# Patient Record
Sex: MALE | Race: WHITE | NOT HISPANIC OR LATINO | Employment: FULL TIME | URBAN - METROPOLITAN AREA
[De-identification: names, ages, dates, MRNs, and addresses within clinical notes are randomized per-mention and may not be internally consistent; named-entity substitution may affect disease eponyms.]

---

## 2017-01-30 ENCOUNTER — TELEPHONE (OUTPATIENT)
Dept: FAMILY MEDICINE | Facility: CLINIC | Age: 42
End: 2017-01-30

## 2017-01-30 DIAGNOSIS — F34.1 DYSTHYMIC DISORDER: Primary | ICD-10-CM

## 2017-01-30 NOTE — TELEPHONE ENCOUNTER
Received a PA Request from [facility]    Re: citalopram (CELEXA) 20 MG tablet, Sig: Take 2 tablets (40 mg) by mouth daily, Disp 180 x 3, Approved 10/31/2016    Request note: Plan does not cover 2 per day.    Can he take one 40mg?  Or do I need to start a PA, they are going to ask the same question for the PA.    Let me know.    Isaias Starr RT (r)  [facility]

## 2017-02-07 RX ORDER — CITALOPRAM HYDROBROMIDE 40 MG/1
40 TABLET ORAL DAILY
Qty: 90 TABLET | Refills: 2 | Status: SHIPPED | OUTPATIENT
Start: 2017-02-07 | End: 2017-11-01

## 2017-10-23 DIAGNOSIS — F34.1 DYSTHYMIC DISORDER: ICD-10-CM

## 2017-10-27 RX ORDER — CITALOPRAM HYDROBROMIDE 40 MG/1
TABLET ORAL
Qty: 90 TABLET | Refills: 0 | OUTPATIENT
Start: 2017-10-27

## 2017-10-27 NOTE — TELEPHONE ENCOUNTER
Pt due for an appointment to see Dr. Mccollum.  Scheduled in clinic on 10/30/17.  Has medication to get to this appointment.  Will ask for refills at that time.    Ave REYNOLDS RN

## 2017-11-01 ENCOUNTER — OFFICE VISIT (OUTPATIENT)
Dept: FAMILY MEDICINE | Facility: CLINIC | Age: 42
End: 2017-11-01
Payer: COMMERCIAL

## 2017-11-01 VITALS
SYSTOLIC BLOOD PRESSURE: 102 MMHG | DIASTOLIC BLOOD PRESSURE: 68 MMHG | BODY MASS INDEX: 29.51 KG/M2 | HEART RATE: 74 BPM | WEIGHT: 206.13 LBS | HEIGHT: 70 IN

## 2017-11-01 DIAGNOSIS — F34.1 DYSTHYMIC DISORDER: Primary | ICD-10-CM

## 2017-11-01 PROCEDURE — 99214 OFFICE O/P EST MOD 30 MIN: CPT | Performed by: FAMILY MEDICINE

## 2017-11-01 RX ORDER — CITALOPRAM HYDROBROMIDE 20 MG/1
TABLET ORAL
Qty: 180 TABLET | Refills: 3 | Status: SHIPPED | OUTPATIENT
Start: 2017-11-01 | End: 2018-10-23

## 2017-11-01 ASSESSMENT — ANXIETY QUESTIONNAIRES
1. FEELING NERVOUS, ANXIOUS, OR ON EDGE: NOT AT ALL
3. WORRYING TOO MUCH ABOUT DIFFERENT THINGS: NOT AT ALL
2. NOT BEING ABLE TO STOP OR CONTROL WORRYING: NOT AT ALL
GAD7 TOTAL SCORE: 1
6. BECOMING EASILY ANNOYED OR IRRITABLE: SEVERAL DAYS
5. BEING SO RESTLESS THAT IT IS HARD TO SIT STILL: NOT AT ALL
7. FEELING AFRAID AS IF SOMETHING AWFUL MIGHT HAPPEN: NOT AT ALL

## 2017-11-01 ASSESSMENT — PATIENT HEALTH QUESTIONNAIRE - PHQ9
SUM OF ALL RESPONSES TO PHQ QUESTIONS 1-9: 4
5. POOR APPETITE OR OVEREATING: NOT AT ALL

## 2017-11-01 NOTE — LETTER
My Depression Action Plan  Name: Bernabe Garcia   Date of Birth 1975  Date: 11/1/2017    My doctor: Aislinn Mccollum   My clinic: [address] [id number] [phone]          GREEN    ZONE   Good Control    What it looks like:     Things are going generally well. You have normal up s and down s. You may even feel depressed from time to time, but bad moods usually last less than a day.   What you need to do:  1. Continue to care for yourself (see self care plan)  2. Check your depression survival kit and update it as needed  3. Follow your physician s recommendations including any medication.  4. Do not stop taking medication unless you consult with your physician first.           YELLOW         ZONE Getting Worse    What it looks like:     Depression is starting to interfere with your life.     It may be hard to get out of bed; you may be starting to isolate yourself from others.    Symptoms of depression are starting to last most all day and this has happened for several days.     You may have suicidal thoughts but they are not constant.   What you need to do:     1. Call your care team, your response to treatment will improve if you keep your care team informed of your progress. Yellow periods are signs an adjustment may need to be made.     2. Continue your self-care, even if you have to fake it!    3. Talk to someone in your support network    4. Open up your depression survival kit           RED    ZONE Medical Alert - Get Help    What it looks like:     Depression is seriously interfering with your life.     You may experience these or other symptoms: You can t get out of bed most days, can t work or engage in other necessary activities, you have trouble taking care of basic hygiene, or basic responsibilities, thoughts of suicide or death that will not go away, self-injurious behavior.     What you need to do:  1. Call your care team and  request a same-day appointment. If they are not available (weekends or after hours) call your local crisis line, emergency room or 911.      Electronically signed by: Ly Wilkes, November 1, 2017    Depression Self Care Plan / Survival Kit    Self-Care for Depression  Here s the deal. Your body and mind are really not as separate as most people think.  What you do and think affects how you feel and how you feel influences what you do and think. This means if you do things that people who feel good do, it will help you feel better.  Sometimes this is all it takes.  There is also a place for medication and therapy depending on how severe your depression is, so be sure to consult with your medical provider and/ or Behavioral Health Consultant if your symptoms are worsening or not improving.     In order to better manage my stress, I will:    Exercise  Get some form of exercise, every day. This will help reduce pain and release endorphins, the  feel good  chemicals in your brain. This is almost as good as taking antidepressants!  This is not the same as joining a gym and then never going! (they count on that by the way ) It can be as simple as just going for a walk or doing some gardening, anything that will get you moving.      Hygiene   Maintain good hygiene (Get out of bed in the morning, Make your bed, Brush your teeth, Take a shower, and Get dressed like you were going to work, even if you are unemployed).  If your clothes don't fit try to get ones that do.    Diet  I will strive to eat foods that are good for me, drink plenty of water, and avoid excessive sugar, caffeine, alcohol, and other mood-altering substances.  Some foods that are helpful in depression are: complex carbohydrates, B vitamins, flaxseed, fish or fish oil, fresh fruits and vegetables.    Psychotherapy  I agree to participate in Individual Therapy (if recommended).    Medication  If prescribed medications, I agree to take them.  Missing  doses can result in serious side effects.  I understand that drinking alcohol, or other illicit drug use, may cause potential side effects.  I will not stop my medication abruptly without first discussing it with my provider.    Staying Connected With Others  I will stay in touch with my friends, family members, and my primary care provider/team.    Use your imagination  Be creative.  We all have a creative side; it doesn t matter if it s oil painting, sand castles, or mud pies! This will also kick up the endorphins.    Witness Beauty  (AKA stop and smell the roses) Take a look outside, even in mid-winter. Notice colors, textures. Watch the squirrels and birds.     Service to others  Be of service to others.  There is always someone else in need.  By helping others we can  get out of ourselves  and remember the really important things.  This also provides opportunities for practicing all the other parts of the program.    Humor  Laugh and be silly!  Adjust your TV habits for less news and crime-drama and more comedy.    Control your stress  Try breathing deep, massage therapy, biofeedback, and meditation. Find time to relax each day.     My support system    Clinic Contact:  Phone number:    Contact 1:  Phone number:    Contact 2:  Phone number:    Scientologist/:  Phone number:    Therapist:  Phone number:    Local crisis center:    Phone number:    Other community support:  Phone number:

## 2017-11-01 NOTE — NURSING NOTE
"Chief Complaint   Patient presents with     Depression       Initial /68  Pulse 74  Ht 5' 10\" (1.778 m)  Wt 206 lb 2 oz (93.5 kg)  BMI 29.58 kg/m2 Estimated body mass index is 29.58 kg/(m^2) as calculated from the following:    Height as of this encounter: 5' 10\" (1.778 m).    Weight as of this encounter: 206 lb 2 oz (93.5 kg).  Medication Reconciliation: complete  "

## 2017-11-01 NOTE — MR AVS SNAPSHOT
"              After Visit Summary   11/1/2017    Bernabe Garcia    MRN: [id number]           Patient Information     Date Of Birth          1975        Visit Information        Provider Department      11/1/2017 2:00 PM Aislinn Mccollum MD [facility]        Today's Diagnoses     Dysthymic disorder    -  1      Care Instructions    *   Overall, you're doing okay.     *   Continue on the Celexa.     *   Ok with the nicotine patch.     *     Sounds like IBS, first step would be to modify diet. I don't see a strong reason to see a specialist.               Follow-ups after your visit        Who to contact     Normal or non-critical lab and imaging results will be communicated to you by XAwarehart, letter or phone within 4 business days after the clinic has received the results. If you do not hear from us within 7 days, please contact the clinic through [employer] or phone. If you have a critical or abnormal lab result, we will notify you by phone as soon as possible.  Submit refill requests through Toldo or call your pharmacy and they will forward the refill request to us. Please allow 3 business days for your refill to be completed.          If you need to speak with a  for additional information , please call: [phone]           Additional Information About Your Visit        [employer] ASA Information     Toldo gives you secure access to your electronic health record. If you see a primary care provider, you can also send messages to your care team and make appointments. If you have questions, please call your primary care clinic.  If you do not have a primary care provider, please call [phone] and they will assist you.        Care EveryWhere ID     This is your Care EveryWhere ID. This could be used by other organizations to access your [place] medical records  [id number]        Your Vitals Were     Pulse Height BMI (Body Mass Index)             74 5' 10\" (1.778 m) 29.58 kg/m2   "        Blood Pressure from Last 3 Encounters:   11/01/17 102/68   10/31/16 90/60   02/03/16 (P) 93/71    Weight from Last 3 Encounters:   11/01/17 206 lb 2 oz (93.5 kg)   10/31/16 204 lb (92.5 kg)   02/03/16 180 lb (81.6 kg)              We Performed the Following     DEPRESSION ACTION PLAN (DAP)          Today's Medication Changes          These changes are accurate as of: 11/1/17  2:28 PM.  If you have any questions, ask your nurse or doctor.               These medicines have changed or have updated prescriptions.        Dose/Directions    citalopram 20 MG tablet   Commonly known as:  celeXA   This may have changed:    - how much to take  - how to take this  - when to take this  - additional instructions  - Another medication with the same name was removed. Continue taking this medication, and follow the directions you see here.   Used for:  Dysthymic disorder   Changed by:  Aislinn Mccollum MD        Take 1-2 tablets daily.   Quantity:  180 tablet   Refills:  3            Where to get your medicines      These medications were sent to [facility] [address] [place] [address] [address] [address] [facility] [place] [address] [facility] [address]     Phone:  [phone]     citalopram 20 MG tablet                Primary Care Provider Office Phone # Fax #    Aislinn Mccollum [phone] [address] [place] [facility] [address]        Equal Access to Services     CARRIE BEVERLY AH: Hadii rajendra ku hadasho Soomaali, waaxda luqadaha, qaybta kaalmada adeegyada, waxay ashok aguiar. So [facility] [phone].    ATENCIÓN: Si habla español, tiene a burris disposición servicios gratuitos de asistencia lingüística. Llame al [phone].    We comply with applicable federal civil rights laws and [place] laws. We do not discriminate on the basis of race, color, national origin, age, disability, sex, sexual orientation, or gender identity.            Thank you!     Thank  you for choosing [facility]  for your care. Our goal is always to provide you with excellent care. Hearing back from our patients is one way we can continue to improve our services. Please take a few minutes to complete the written survey that you may receive in the mail after your visit with us. Thank you!             Your Updated Medication List - Protect others around you: Learn how to safely use, store and throw away your medicines at www.disposemymeds.org.          This list is accurate as of: 11/1/17  2:28 PM.  Always use your most recent med list.                   Brand Name Dispense Instructions for use Diagnosis    citalopram 20 MG tablet    celeXA    180 tablet    Take 1-2 tablets daily.    Dysthymic disorder       nicotine 14 MG/24HR 24 hr patch    NICODERM CQ     Place 1 patch onto the skin every 24 hours

## 2017-11-01 NOTE — PROGRESS NOTES
"  SUBJECTIVE:   Bernabe Garcia is a 42 year old male who presents to clinic today for the following health issues:      Depression Followup  Celexa 40mg qd    Status since last visit: Stable    See PHQ-9 for current symptoms.  Other associated symptoms: None    Complicating factors:   Significant life event:  Yes-  Daughter went to College in [place]    Current substance abuse:  None  Anxiety or Panic symptoms:  No    PHQ-9 Score and MyChart F/U Questions 10/31/2016 11/1/2017   Total Score 5 4   Q9: Suicide Ideation Not at all Not at all          PHQ-9  English  PHQ-9   Any Language  Suicide Assessment Five-step Evaluation and Treatment (SAFE-T)      Amount of exercise or physical activity: None    Problems taking medications regularly: No    Medication side effects: none    Diet: regular (no restrictions)        ROS:  Constitutional, HEENT, cardiovascular, pulmonary, gi and gu systems are negative, except as otherwise noted.    This document serves as a record of the services and decisions personally performed and made by Aislinn Mccollum MD. It was created on his behalf by Joni Griffin, a trained medical scribe. The creation of this document is based the provider's statements to the medical scribe.  Joni Griffin 2:17 PM November 1, 2017  OBJECTIVE:   /68  Pulse 74  Ht 5' 10\" (1.778 m)  Wt 206 lb 2 oz (93.5 kg)  BMI 29.58 kg/m2  Body mass index is 29.58 kg/(m^2).       GENERAL: healthy, alert and no distress  EYES: Eyes grossly normal to inspection, conjunctivae and sclerae normal  MS: no gross musculoskeletal defects noted, no edema  SKIN: no suspicious lesions or rashes  NEURO: Normal strength and tone, mentation intact and speech normal  PSYCH: mentation appears normal, affect normal/bright      ASSESSMENT/PLAN:     (F34.1) Dysthymic disorder  (primary encounter diagnosis)  Comment: Mood is stable. Patient is doing well on SSRI therapy. Side effects reviewed.   Plan: DEPRESSION ACTION PLAN (DAP), " citalopram         (CELEXA) 20 MG tablet              Patient Instructions   *   Overall, you're doing okay.     *   Continue on the Celexa.     *   Ok with the nicotine patch.     *     Sounds like IBS, first step would be to modify diet. I don't see a strong reason to see a specialist.          total time spent with pt. was 25 minutes, 20 minutes of the visit was spent discussing the above issues, including pathophysiology, treatment options, and expected outcomes.        Patient will follow up in 12 months or sooner, PRN. Patient instructed to call with any questions or concerns.    The information in this document, created by a scribe for me, accurately reflects the services I personally performed and the decisions made by me. I have reviewed and approved this document for accuracy. 2:17 PM 11/1/2017    Aislinn Mccollum MD  [facility]

## 2017-11-01 NOTE — PATIENT INSTRUCTIONS
*   Overall, you're doing okay.     *   Continue on the Celexa.     *   Ok with the nicotine patch.     *     Sounds like IBS, first step would be to modify diet. I don't see a strong reason to see a specialist.

## 2017-11-02 ASSESSMENT — ANXIETY QUESTIONNAIRES: GAD7 TOTAL SCORE: 1

## 2017-11-03 ENCOUNTER — TELEPHONE (OUTPATIENT)
Dept: FAMILY MEDICINE | Facility: CLINIC | Age: 42
End: 2017-11-03

## 2017-11-03 NOTE — TELEPHONE ENCOUNTER
Received PA request for Citalopram 20mg from [facility]  Pharmacy Rejection Note: Plan does not cover this medicaiton    Sig: Take 1-2 tablets daily.  Disp: 180 per 90  AGUSTÍN: no    No previous PA on file for this med.    Dx:  Dysthymic disorder [F34.1]   Rationale: Tx of Dysthymic disorder [F34.1]     Provided Ins: not provided  Provided Ins ID: [id number]  Provided Ins Phone # [phone]    [employer] Insurance Verification  Member Number [id number]  Carrier Number [id number]  Account Number [id number]  Group Number [id number]    PA submitted to [employer] [facility] via CoverDomobios, Keyfredy [id number]    Isaias MADISON (r)  MAKAYLA Austin

## 2017-11-03 NOTE — TELEPHONE ENCOUNTER
Received response from [facility]    Response faxed to the pharmacy.        Isaias Starr RT (r)  [facility]

## 2017-12-05 ENCOUNTER — OFFICE VISIT (OUTPATIENT)
Dept: FAMILY MEDICINE | Facility: CLINIC | Age: 42
End: 2017-12-05
Payer: COMMERCIAL

## 2017-12-05 VITALS
DIASTOLIC BLOOD PRESSURE: 60 MMHG | SYSTOLIC BLOOD PRESSURE: 100 MMHG | BODY MASS INDEX: 29.92 KG/M2 | WEIGHT: 209 LBS | HEIGHT: 70 IN | HEART RATE: 76 BPM

## 2017-12-05 DIAGNOSIS — H81.10 BENIGN PAROXYSMAL POSITIONAL VERTIGO, UNSPECIFIED LATERALITY: Primary | ICD-10-CM

## 2017-12-05 PROCEDURE — 99213 OFFICE O/P EST LOW 20 MIN: CPT | Performed by: FAMILY MEDICINE

## 2017-12-05 NOTE — NURSING NOTE
"Chief Complaint   Patient presents with     Dizziness       Initial /60  Pulse 76  Ht 5' 10\" (1.778 m)  Wt 209 lb (94.8 kg)  BMI 29.99 kg/m2 Estimated body mass index is 29.99 kg/(m^2) as calculated from the following:    Height as of this encounter: 5' 10\" (1.778 m).    Weight as of this encounter: 209 lb (94.8 kg).  Medication Reconciliation: complete  "

## 2017-12-05 NOTE — MR AVS SNAPSHOT
After Visit Summary   12/5/2017    Bernabe Garcia    MRN: [id number]           Patient Information     Date Of Birth          1975        Visit Information        Provider Department      12/5/2017 2:40 PM Aislinn Mccollum MD [facility]        Today's Diagnoses     Benign paroxysmal positional vertigo, unspecified laterality    -  1       Follow-ups after your visit        Additional Services     [facility] PT, HAND, AND CHIROPRACTIC REFERRAL       **This order will print in the [facility] Scheduling Office**    Physical Therapy, Hand Therapy and Chiropractic Care are available through:    *[facility]  *[facility]  *[facility]    Call one number to schedule at any of the above locations: [phone].    Your provider has referred you to: Physical Therapy at [facility] or [facility]    Indication/Reason for Referral: balance training.   Onset of Illness: days   Therapy Orders: Evaluate and Treat  Special Programs: None  Special Request: Exercise: balance  None    Michael Rodriguez      Additional Comments for the Therapist or Chiropractor:     Please be aware that coverage of these services is subject to the terms and limitations of your health insurance plan.  Call member services at your health plan with any benefit or coverage questions.      Please bring the following to your appointment:    *Your personal calendar for scheduling future appointments  *Comfortable clothing                  Who to contact     Normal or non-critical lab and imaging results will be communicated to you by MyChart, letter or phone within 4 business days after the clinic has received the results. If you do not hear from us within 7 days, please contact the clinic through MyChart or phone. If you have a critical or abnormal lab result, we will notify you by phone as soon as possible.  Submit refill requests through STYLHUNT or call your pharmacy and they will forward the  "refill request to us. Please allow 3 business days for your refill to be completed.          If you need to speak with a  for additional information , please call: [phone]           Additional Information About Your Visit        Bookerhart Information     Bookerhart gives you secure access to your electronic health record. If you see a primary care provider, you can also send messages to your care team and make appointments. If you have questions, please call your primary care clinic.  If you do not have a primary care provider, please call [phone] and they will assist you.        Care EveryWhere ID     This is your Care EveryWhere ID. This could be used by other organizations to access your [place] medical records  [id number]        Your Vitals Were     Pulse Height BMI (Body Mass Index)             76 5' 10\" (1.778 m) 29.99 kg/m2          Blood Pressure from Last 3 Encounters:   12/05/17 100/60   11/01/17 102/68   10/31/16 90/60    Weight from Last 3 Encounters:   12/05/17 209 lb (94.8 kg)   11/01/17 206 lb 2 oz (93.5 kg)   10/31/16 204 lb (92.5 kg)              We Performed the Following     [facility] PT, HAND, AND CHIROPRACTIC REFERRAL        Primary Care Provider Office Phone # Fax #    Aislinn Mccollum [phone] [address] [place] [address]        Equal Access to Services     : Hadii aad ku hadasho Soomaali, waaxda luqadaha, qaybta kaalmada adeegyada, waxay ashok hayayse arriola'jasminn . So [facility] [phone].    ATENCIÓN: Si habla español, tiene a burris disposición servicios gratuitos de asistencia lingüística. Llame al [phone].    We comply with applicable federal civil rights laws and [place] laws. We do not discriminate on the basis of race, color, national origin, age, disability, sex, sexual orientation, or gender identity.            Thank you!     Thank you for choosing [facility] [place]  for your care. Our goal is " always to provide you with excellent care. Hearing back from our patients is one way we can continue to improve our services. Please take a few minutes to complete the written survey that you may receive in the mail after your visit with us. Thank you!             Your Updated Medication List - Protect others around you: Learn how to safely use, store and throw away your medicines at www.disposemymeds.org.          This list is accurate as of: 12/5/17 11:59 PM.  Always use your most recent med list.                   Brand Name Dispense Instructions for use Diagnosis    citalopram 20 MG tablet    celeXA    180 tablet    Take 1-2 tablets daily.    Dysthymic disorder       nicotine 14 MG/24HR 24 hr patch    NICODERM CQ     Place 1 patch onto the skin every 24 hours

## 2017-12-05 NOTE — PROGRESS NOTES
"  SUBJECTIVE:   Bernabe Garcia is a 42 year old male who presents to clinic today for the following health issues:      Dizziness  Onset: 1 week ago    Description:   Do you feel faint:  YES  Does it feel like the surroundings (bed, room) are moving: YES  Unsteady/off balance: YES  Have you passed out or fallen: no     Intensity: moderate - episodic, symptoms lasting for only seconds at a time    Progression of Symptoms:  intermittent    Accompanying Signs & Symptoms:  Heart palpitations: no   Nausea, vomiting: YES- nausea  Weakness in arms or legs: no   Fatigue: no   Vision or speech changes: no   Ringing in ears (Tinnitus): no   Hearing Loss: no     History:   Head trauma/concussion hx: no   Previous similar symptoms: no   Recent bleeding history: no     Precipitating factors:   Worse with activity or head movement: YES - occur when laying down on his back and moving head to the side or when he is looking down  Any new medications (BP?): no   Alcohol/drug abuse/withdrawal: no     Alleviating factors:   Does staying in a fixed position give relief:  YES    Therapies Tried and outcome: Epley maneuver and Dramimine with some improvement. These symptoms subsided for a couple days with the use of the Epley manuevur, but then appeared again this morning.    On one occasion last week, he woke up in the middle of the night with these symptoms.     He denies any headache, but has felt some sinus pressure.      ROS:  Constitutional, HEENT, cardiovascular, pulmonary, gi and gu systems are negative, except as otherwise noted.      This document serves as a record of the services and decisions personally performed and made by Aislinn Mccollum MD. It was created on his behalf by Amara Mejia, a trained medical scribe. The creation of this document is based the provider's statements to the medical scribe.  Amara Mejia 2:50 PM December 5, 2017  OBJECTIVE:     /60  Pulse 76  Ht 5' 10\" (1.778 m)  Wt 209 lb (94.8 kg)  BMI 29.99 " kg/m2  Body mass index is 29.99 kg/(m^2).     GENERAL: Healthy, alert and no distress  EYES: Eyes grossly normal to inspection, conjunctivae and sclerae normal  HENT: Ear canals and TM's normal, nose and mouth without ulcers or lesions  NEURO: Normal strength and tone, mentation intact and speech normal, rhomberg negative, gait appears normal, no nystagmus   PSYCH: Mentation appears normal, affect normal/bright    ASSESSMENT/PLAN:     (H81.10) Benign paroxysmal positional vertigo, unspecified laterality  (primary encounter diagnosis)  Comment: No red flag signs. Reassurance given that this is a benign condition. He has had some improvement with use of the Epley manuevur at home. I advised PT for further treatment if his symptoms persist.  Plan: [facility] PT, HAND, AND CHIROPRACTIC REFERRAL - Follow-up if symptoms worsen or do not improve with PT.        Patient will follow up PRN. Patient instructed to call with any questions or concerns.    There are no Patient Instructions on file for this visit.    The information in this document, created by a scribe for me, accurately reflects the services I personally performed and the decisions made by me. I have reviewed and approved this document for accuracy.  2:58 PM December 5, 2017    Aislinn Mccollum MD  [facility]

## 2018-10-23 DIAGNOSIS — F34.1 DYSTHYMIC DISORDER: ICD-10-CM

## 2018-10-23 NOTE — TELEPHONE ENCOUNTER
"Requested Prescriptions   Pending Prescriptions Disp Refills     citalopram (CELEXA) 40 MG tablet [Pharmacy Med Name: CITALOPRAM 40MG TABLETS] 90 tablet 0    Last Written Prescription Date:  11/01/2017 #180 x 3  Last filled 07/28/2018  Last office visit: 12/05/2017 GAMAL Mccollum   Future Office Visit:  None   Sig: TAKE ONE-HALF TO ONE TABLETS BY MOUTH DAILY    SSRIs Protocol Failed    10/23/2018 10:45 AM       Failed - PHQ-9 score less than 5 in past 6 months    Please review last PHQ-9 score.   PHQ-9 SCORE 8/17/2015 10/31/2016 11/1/2017   Total Score 1 - -   Total Score - 5 4       JOHN-7 SCORE 8/17/2015 10/31/2016 11/1/2017   Total Score 1 - -   Total Score - 2 1                Failed - Recent (6 mo) or future (30 days) visit within the authorizing provider's specialty    Patient had office visit in the last 6 months or has a visit in the next 30 days with authorizing provider or within the authorizing provider's specialty.  See \"Patient Info\" tab in inbasket, or \"Choose Columns\" in Meds & Orders section of the refill encounter.           Passed - Patient is age 18 or older          "

## 2018-10-24 RX ORDER — CITALOPRAM HYDROBROMIDE 40 MG/1
TABLET ORAL
Qty: 90 TABLET | Refills: 0 | Status: SHIPPED | OUTPATIENT
Start: 2018-10-24 | End: 2018-11-26

## 2018-10-24 NOTE — TELEPHONE ENCOUNTER
Prescription approved per [facility] Refill Protocol or patient Primary care provider (PCP)  LEIGH Wheat RN/Michele Austin

## 2018-11-26 ENCOUNTER — OFFICE VISIT (OUTPATIENT)
Dept: FAMILY MEDICINE | Facility: CLINIC | Age: 43
End: 2018-11-26
Payer: COMMERCIAL

## 2018-11-26 VITALS
OXYGEN SATURATION: 99 % | SYSTOLIC BLOOD PRESSURE: 108 MMHG | HEIGHT: 69 IN | BODY MASS INDEX: 25.51 KG/M2 | DIASTOLIC BLOOD PRESSURE: 68 MMHG | WEIGHT: 172.2 LBS | HEART RATE: 74 BPM | TEMPERATURE: 97.2 F

## 2018-11-26 DIAGNOSIS — D17.30 LIPOMA OF SKIN AND SUBCUTANEOUS TISSUE: Primary | ICD-10-CM

## 2018-11-26 DIAGNOSIS — R63.4 WEIGHT LOSS: ICD-10-CM

## 2018-11-26 DIAGNOSIS — F34.1 DYSTHYMIC DISORDER: ICD-10-CM

## 2018-11-26 DIAGNOSIS — F17.290 OTHER TOBACCO PRODUCT NICOTINE DEPENDENCE, UNCOMPLICATED: ICD-10-CM

## 2018-11-26 DIAGNOSIS — Z23 NEED FOR PROPHYLACTIC VACCINATION AND INOCULATION AGAINST INFLUENZA: ICD-10-CM

## 2018-11-26 PROCEDURE — 90471 IMMUNIZATION ADMIN: CPT | Performed by: FAMILY MEDICINE

## 2018-11-26 PROCEDURE — 90686 IIV4 VACC NO PRSV 0.5 ML IM: CPT | Performed by: FAMILY MEDICINE

## 2018-11-26 PROCEDURE — 99214 OFFICE O/P EST MOD 30 MIN: CPT | Mod: 25 | Performed by: FAMILY MEDICINE

## 2018-11-26 RX ORDER — CITALOPRAM HYDROBROMIDE 40 MG/1
TABLET ORAL
Qty: 90 TABLET | Refills: 0 | Status: SHIPPED | OUTPATIENT
Start: 2018-11-26 | End: 2019-08-10

## 2018-11-26 ASSESSMENT — ANXIETY QUESTIONNAIRES
2. NOT BEING ABLE TO STOP OR CONTROL WORRYING: NOT AT ALL
7. FEELING AFRAID AS IF SOMETHING AWFUL MIGHT HAPPEN: NOT AT ALL
5. BEING SO RESTLESS THAT IT IS HARD TO SIT STILL: NOT AT ALL
3. WORRYING TOO MUCH ABOUT DIFFERENT THINGS: NOT AT ALL
GAD7 TOTAL SCORE: 0
6. BECOMING EASILY ANNOYED OR IRRITABLE: NOT AT ALL
1. FEELING NERVOUS, ANXIOUS, OR ON EDGE: NOT AT ALL

## 2018-11-26 ASSESSMENT — PATIENT HEALTH QUESTIONNAIRE - PHQ9
5. POOR APPETITE OR OVEREATING: NOT AT ALL
SUM OF ALL RESPONSES TO PHQ QUESTIONS 1-9: 2

## 2018-11-26 NOTE — LETTER
My Depression Action Plan  Name: Bernabe Garcia   Date of Birth 1975  Date: 11/26/2018    My doctor: Aislinn Mccollum   My clinic: [address] [address] [phone]          GREEN    ZONE   Good Control    What it looks like:     Things are going generally well. You have normal up s and down s. You may even feel depressed from time to time, but bad moods usually last less than a day.   What you need to do:  1. Continue to care for yourself (see self care plan)  2. Check your depression survival kit and update it as needed  3. Follow your physician s recommendations including any medication.  4. Do not stop taking medication unless you consult with your physician first.           YELLOW         ZONE Getting Worse    What it looks like:     Depression is starting to interfere with your life.     It may be hard to get out of bed; you may be starting to isolate yourself from others.    Symptoms of depression are starting to last most all day and this has happened for several days.     You may have suicidal thoughts but they are not constant.   What you need to do:     1. Call your care team, your response to treatment will improve if you keep your care team informed of your progress. Yellow periods are signs an adjustment may need to be made.     2. Continue your self-care, even if you have to fake it!    3. Talk to someone in your support network    4. Open up your depression survival kit           RED    ZONE Medical Alert - Get Help    What it looks like:     Depression is seriously interfering with your life.     You may experience these or other symptoms: You can t get out of bed most days, can t work or engage in other necessary activities, you have trouble taking care of basic hygiene, or basic responsibilities, thoughts of suicide or death that will not go away, self-injurious behavior.     What you need to do:  1. Call your care team and  request a same-day appointment. If they are not available (weekends or after hours) call your local crisis line, emergency room or 911.            Depression Self Care Plan / Survival Kit    Self-Care for Depression  Here s the deal. Your body and mind are really not as separate as most people think.  What you do and think affects how you feel and how you feel influences what you do and think. This means if you do things that people who feel good do, it will help you feel better.  Sometimes this is all it takes.  There is also a place for medication and therapy depending on how severe your depression is, so be sure to consult with your medical provider and/ or Behavioral Health Consultant if your symptoms are worsening or not improving.     In order to better manage my stress, I will:    Exercise  Get some form of exercise, every day. This will help reduce pain and release endorphins, the  feel good  chemicals in your brain. This is almost as good as taking antidepressants!  This is not the same as joining a gym and then never going! (they count on that by the way ) It can be as simple as just going for a walk or doing some gardening, anything that will get you moving.      Hygiene   Maintain good hygiene (Get out of bed in the morning, Make your bed, Brush your teeth, Take a shower, and Get dressed like you were going to work, even if you are unemployed).  If your clothes don't fit try to get ones that do.    Diet  I will strive to eat foods that are good for me, drink plenty of water, and avoid excessive sugar, caffeine, alcohol, and other mood-altering substances.  Some foods that are helpful in depression are: complex carbohydrates, B vitamins, flaxseed, fish or fish oil, fresh fruits and vegetables.    Psychotherapy  I agree to participate in Individual Therapy (if recommended).    Medication  If prescribed medications, I agree to take them.  Missing doses can result in serious side effects.  I understand that  drinking alcohol, or other illicit drug use, may cause potential side effects.  I will not stop my medication abruptly without first discussing it with my provider.    Staying Connected With Others  I will stay in touch with my friends, family members, and my primary care provider/team.    Use your imagination  Be creative.  We all have a creative side; it doesn t matter if it s oil painting, sand castles, or mud pies! This will also kick up the endorphins.    Witness Beauty  (AKA stop and smell the roses) Take a look outside, even in mid-winter. Notice colors, textures. Watch the squirrels and birds.     Service to others  Be of service to others.  There is always someone else in need.  By helping others we can  get out of ourselves  and remember the really important things.  This also provides opportunities for practicing all the other parts of the program.    Humor  Laugh and be silly!  Adjust your TV habits for less news and crime-drama and more comedy.    Control your stress  Try breathing deep, massage therapy, biofeedback, and meditation. Find time to relax each day.     My support system    Clinic Contact:  Phone number:    Contact 1:  Phone number:    Contact 2:  Phone number:    Anabaptism/:  Phone number:    Therapist:  Phone number:    Local crisis center:    Phone number:    Other community support:  Phone number:

## 2018-11-26 NOTE — MR AVS SNAPSHOT
After Visit Summary   11/26/2018    Bernabe Garcia    MRN: [id number]           Patient Information     Date Of Birth          1975        Visit Information        Provider Department      11/26/2018 11:20 AM Aislinn Mccollum MD [facility]        Today's Diagnoses     Need for prophylactic vaccination and inoculation against influenza    -  1    Lipoma of skin and subcutaneous tissue        Dysthymic disorder        Other tobacco product nicotine dependence, uncomplicated          Care Instructions    *   I think it's a harmless lipoma, but have a surgeon take a look at it, just for reassurance. Call [phone].     *   Continue on the Celexa.     *   See if the stop on your nose goes away.     *   Overall, you're doing well.           Follow-ups after your visit        Additional Services     GENERAL SURG ADULT REFERRAL       Your provider has referred you to: [facility]: [facility] [phone]   [email or web address]    Please be aware that coverage of these services is subject to the terms and limitations of your health insurance plan.  Call member services at your health plan with any benefit or coverage questions.      Please bring the following with you to your appointment:    (1) Any X-Rays, CTs or MRIs which have been performed.  Contact the facility where they were done to arrange for  prior to your scheduled appointment.   (2) List of current medications   (3) This referral request   (4) Any documents/labs given to you for this referral                  Follow-up notes from your care team     Return in about 6 months (around 5/26/2019) for Routine Visit.      Who to contact     Normal or non-critical lab and imaging results will be communicated to you by MyChart, letter or phone within 4 business days after the clinic has received the results. If you do not hear from us within 7 days, please contact the clinic through  "MyChart or phone. If you have a critical or abnormal lab result, we will notify you by phone as soon as possible.  Submit refill requests through [facility] or call your pharmacy and they will forward the refill request to us. Please allow 3 business days for your refill to be completed.          If you need to speak with a  for additional information , please call: [phone]           Additional Information About Your Visit        [facility] Information     [employer] gives you secure access to your electronic health record. If you see a primary care provider, you can also send messages to your care team and make appointments. If you have questions, please call your primary care clinic.  If you do not have a primary care provider, please call [phone] and they will assist you.        Care EveryWhere ID     This is your Care EveryWhere ID. This could be used by other organizations to access your [facility] medical records  [id number]        Your Vitals Were     Pulse Temperature Height Pulse Oximetry BMI (Body Mass Index)       74 97.2  F (36.2  C) (Tympanic) 5' 9.25\" (1.759 m) 99% 25.25 kg/m2        Blood Pressure from Last 3 Encounters:   11/26/18 108/68   12/05/17 100/60   11/01/17 102/68    Weight from Last 3 Encounters:   11/26/18 172 lb 3.2 oz (78.1 kg)   12/05/17 209 lb (94.8 kg)   11/01/17 206 lb 2 oz (93.5 kg)              We Performed the Following     DEPRESSION ACTION PLAN (DAP)     FLU VACCINE, SPLIT VIRUS, IM (QUADRIVALENT) [77034]- >3 YRS     GENERAL SURG ADULT REFERRAL     Vaccine Administration, Initial [46233]          Today's Medication Changes          These changes are accurate as of 11/26/18 11:45 AM.  If you have any questions, ask your nurse or doctor.               These medicines have changed or have updated prescriptions.        Dose/Directions    citalopram 40 MG tablet   Commonly known as:  celeXA   This may have changed:  See the new instructions.   Used for:  Dysthymic " disorder   Changed by:  Aislinn Mccollum MD        TAKE ONE-HALF TO ONE TABLETS BY MOUTH DAILY.  Profile.   Quantity:  90 tablet   Refills:  0            Where to get your medicines      Some of these will need a paper prescription and others can be bought over the counter.  Ask your nurse if you have questions.     Bring a paper prescription for each of these medications     citalopram 40 MG tablet                Primary Care Provider Office Phone # Fax #    Aislinn Mccollum [phone] [address] [place] [address]        Equal Access to Services     [facility]: Hadii aad ku hadasho Soomaali, waaxda luqadaha, qaybta kaalmada adeegyada, waxay idiin hayaan adeeg kharash lamacie . So [facility] [phone].    ATENCIÓN: Si habla español, tiene a burris disposición servicios gratuitos de asistencia lingüística. Llame al [phone].    We comply with applicable federal civil rights laws and [place] laws. We do not discriminate on the basis of race, color, national origin, age, disability, sex, sexual orientation, or gender identity.            Thank you!     Thank you for choosing [facility]  for your care. Our goal is always to provide you with excellent care. Hearing back from our patients is one way we can continue to improve our services. Please take a few minutes to complete the written survey that you may receive in the mail after your visit with us. Thank you!             Your Updated Medication List - Protect others around you: Learn how to safely use, store and throw away your medicines at www.disposemymeds.org.          This list is accurate as of 11/26/18 11:45 AM.  Always use your most recent med list.                   Brand Name Dispense Instructions for use Diagnosis    citalopram 40 MG tablet    celeXA    90 tablet    TAKE ONE-HALF TO ONE TABLETS BY MOUTH DAILY.  Profile.    Dysthymic disorder       nicotine 14 MG/24HR 24 hr patch    NICODERM CQ     Place  1 patch onto the skin every 24 hours

## 2018-11-26 NOTE — PATIENT INSTRUCTIONS
*   I think it's a harmless lipoma, but have a surgeon take a look at it, just for reassurance. Call [phone].     *   Continue on the Celexa.     *   See if the stop on your nose goes away.     *   Overall, you're doing well.

## 2018-11-26 NOTE — PROGRESS NOTES
"  SUBJECTIVE:   Bernabe Garcia is a 43 year old male who presents to clinic today for the following health issues:      Lump right mid back      Duration: 6 weeks    Description (location/character/radiation): right upper lumbar lateral aspect, firm, approximately 3\"     Intensity:  Mild discomfort    Accompanying signs and symptoms: none    History (similar episodes/previous evaluation): None    Precipitating or alleviating factors: None    Therapies tried and outcome: None     Dysthymia  Patient symptoms are well managed with citalopram.  Denies any adverse effects from medication.    Tobacco Abuse  Patient states currently uses nicotine patches or gum.  Patient states need some form of nicotine, otherwise gets irritable. Hx of smokeless tobacco use.  Denies smoking cigarettes.      Problem list and histories reviewed & adjusted, as indicated.  Additional history: as documented    Labs reviewed in EPIC    Reviewed and updated as needed this visit by clinical staff  Tobacco  Allergies  Meds  Med Hx  Surg Hx  Fam Hx  Soc Hx      Reviewed and updated as needed this visit by Provider         ROS::  CONSTITUTIONAL: NEGATIVE for fever, chills, POSITIVE for weight loss  INTEGUMENTARY/SKIN: POSITIVE for mass on right lateral lumbar back  EYES: NEGATIVE for vision changes or irritation  ENT/MOUTH: NEGATIVE for ear, mouth and throat problems  RESP: NEGATIVE for significant cough or SOB  NEURO: NEGATIVE for weakness, dizziness or paresthesias  PSYCHIATRIC: NEGATIVE for changes in mood or affect      This document serves as a record of the services and decisions personally performed and made by Aislinn Mccollum MD. It was created on his behalf by Reymundo Harrison, a trained medical scribe. The creation of this document is based the provider's statements to the medical scribe.  Reymundo Harrison 11:21 AM November 26, 2018    OBJECTIVE:                                                    /68 (BP Location: Right arm, Cuff Size: " "Adult Regular)  Pulse 74  Temp 97.2  F (36.2  C) (Tympanic)  Ht 5' 9.25\" (1.759 m)  Wt 172 lb 3.2 oz (78.1 kg)  SpO2 99%  BMI 25.25 kg/m2  Body mass index is 25.25 kg/(m^2).   GENERAL: healthy, alert, well nourished, well hydrated, no distress  Resp: clear, no wheezes, rales, or rhonchi.   CV:  RRR without murmur  GI:  soft, nontender, no HSM   SKIN: mobile firm 2 cm nodule right lateral upper lumbar area  PSY: alert, pleasant, mood and affect appropriate.      ASSESSMENT/PLAN:                                                      (D17.30) Lipoma of skin and subcutaneous tissue  (primary encounter diagnosis)  Comment: Exam is consistent with a benign lipoma.  History is somewhat inconsistent however.  He feels the mass may have grown, onset 6 weeks ago.  Plan: GENERAL SURG ADULT REFERRAL       Will refer for surgical consultation.    (Z23) Need for prophylactic vaccination and inoculation against influenza  Plan: FLU VACCINE, SPLIT VIRUS, IM (QUADRIVALENT)         [83208]- >3 YRS, Vaccine Administration,         Initial [19274]         (F34.1) Dysthymic disorder  Comment: Appears to be doing well on SSRI therapy.  Patient notes marked decrease in irritability.  Plan: citalopram (CELEXA) 40 MG tablet        Continue.  1 year refill.    (F17.290) Other tobacco product nicotine dependence, uncomplicated  Comment: Patient appears to be addicted to nicotine patches.  He has been on them for years.  Discussed this is probably safer than smoking or chewing tobacco however advised that discontinuing nicotine entirely would be is the healthiest option.  Plan: Patient would consider this.      (R63.4) Weight loss  Comment: Patient states this is consistent with prior weight loss when he is adopted low carbohydrate diet.  Plan: Encourage follow-up with surgery regarding his mass.  Discussed that weight loss can be associated with malignancy, however this appears more consistent with lifestyle.  There is no associated " other constitutional symptoms.          Patient Instructions   *   I think it's a harmless lipoma, but have a surgeon take a look at it, just for reassurance.     *   Continue on the Celexa.     *   At some point       Follow up with Provider - Follow up as needed.  Advised to call back directly if there are further questions, or if these symptoms fail to improve as anticipated or worsen.     See Patient Instructions      The information in this document, created by a scribe for me, accurately reflects the services I personally performed and the decisions made by me. I have reviewed and approved this document for accuracy.     Aislinn Mccollum MD  [facility]      Injectable Influenza Immunization Documentation    1.  Is the person to be vaccinated sick today?   No    2. Does the person to be vaccinated have an allergy to a component   of the vaccine?   No  Egg Allergy Algorithm Link    3. Has the person to be vaccinated ever had a serious reaction   to influenza vaccine in the past?   No    4. Has the person to be vaccinated ever had Guillain-Barré syndrome?   No    Form completed by Melisa Senior CMA on 11/26/2018 at 11:16 AM

## 2018-11-27 ASSESSMENT — ANXIETY QUESTIONNAIRES: GAD7 TOTAL SCORE: 0

## 2019-01-29 ENCOUNTER — OFFICE VISIT (OUTPATIENT)
Dept: FAMILY MEDICINE | Facility: CLINIC | Age: 44
End: 2019-01-29
Payer: COMMERCIAL

## 2019-01-29 VITALS
DIASTOLIC BLOOD PRESSURE: 73 MMHG | SYSTOLIC BLOOD PRESSURE: 115 MMHG | TEMPERATURE: 98.2 F | WEIGHT: 171.06 LBS | BODY MASS INDEX: 25.08 KG/M2

## 2019-01-29 DIAGNOSIS — Z23 NEED FOR VACCINATION: ICD-10-CM

## 2019-01-29 DIAGNOSIS — Z71.84 COUNSELING FOR TRAVEL: Primary | ICD-10-CM

## 2019-01-29 PROCEDURE — 90691 TYPHOID VACCINE IM: CPT | Mod: GA | Performed by: FAMILY MEDICINE

## 2019-01-29 PROCEDURE — 90707 MMR VACCINE SC: CPT | Mod: GA | Performed by: FAMILY MEDICINE

## 2019-01-29 PROCEDURE — 99402 PREV MED CNSL INDIV APPRX 30: CPT | Mod: 25 | Performed by: FAMILY MEDICINE

## 2019-01-29 PROCEDURE — 90472 IMMUNIZATION ADMIN EACH ADD: CPT | Mod: GA | Performed by: FAMILY MEDICINE

## 2019-01-29 PROCEDURE — 90471 IMMUNIZATION ADMIN: CPT | Mod: GA | Performed by: FAMILY MEDICINE

## 2019-01-29 PROCEDURE — 90632 HEPA VACCINE ADULT IM: CPT | Mod: GA | Performed by: FAMILY MEDICINE

## 2019-01-29 RX ORDER — AZITHROMYCIN 500 MG/1
TABLET, FILM COATED ORAL
Qty: 3 TABLET | Refills: 0 | Status: SHIPPED | OUTPATIENT
Start: 2019-01-29 | End: 2020-06-22

## 2019-01-29 NOTE — PROGRESS NOTES
Itinerary:  [place]    Departure Date: 02/12/219    Return Date: 02/24/2019    Length of Trip: 12 days    Purpose of Trip: business    Urban/Rural: urban    Accommodations: AirB    IMMUNIZATION HISTORY  Have you received any immunizations within the past 4 weeks?  Yes  Have you ever fainted from having your blood drawn or from an injection?  No  Have you ever had a fever reaction to vaccination?  No  Have you ever had any bad reaction or side effect from any vaccination?  No  Have you ever had hepatitis A or B vaccine?  No  Do you live (or work closely) with anyone who has AIDS, an AIDS-like condition, any other immune disorder or who is on chemotherapy for cancer or a   family history of immunodeficiency?  No  Have you received any injection of immune globulin or any blood products during the past 12 months?  No    Patient roomed by [employer]      Special medical concerns: none    /[birth year]   Temp 98.2  F (36.8  C) (Oral)   Wt 77.6 kg (171 lb 1 oz)   BMI 25.08 kg/m    EXAM: deferred      Travelcounseling visit  For this patient reviewed current immunization records  Immunization History   Administered Date(s) Administered     HepA-Adult 01/29/2019     Influenza (IIV3) PF 01/18/2012     Influenza Vaccine IM 3yrs+ 4 Valent IIV4 11/26/2018     MMR 01/29/2019     TDAP Vaccine (Adacel) 01/18/2012     Typhoid IM 01/29/2019         /[birth year]   Temp 98.2  F (36.8  C) (Oral)   Wt 77.6 kg (171 lb 1 oz)   BMI 25.08 kg/m    EXAM: deferred    For this patient reviewed needed immunizations as well as malaria prophylaxis if needed and symptomatic treatment for traveler's diarrhea as follows  Orders Placed This Encounter   Procedures     HEPA VACCINE ADULT IM     TYPHOID VACCINE, IM     MMR VIRUS IMMUNIZATION, SUBCUT     HEPA VACCINE ADULT IM       ASSESSMENT/PLAN:    ICD-10-CM    1. Counseling for travel Z71.89 HEPA VACCINE ADULT IM     TYPHOID VACCINE, IM     MMR VIRUS IMMUNIZATION, SUBCUT     azithromycin  (ZITHROMAX) 500 MG tablet   2. Need for vaccination Z23 HEPA VACCINE ADULT IM     TYPHOID VACCINE, IM     MMR VIRUS IMMUNIZATION, SUBCUT     I have reviewed general recommendations for safe travel   including: food/water precautions, insect avoidance,  roadway safety. Educational materials and [employer] report provided.    Total visit time 30 minutes, over 50% of time spent counseling patient.

## 2019-01-29 NOTE — PATIENT INSTRUCTIONS
Today January 29, 2019 you received the    Hepatitis A Vaccine - Please return on 7/28/19 or later for your 2nd and final dose.  .    These appointments can be made as a NURSE ONLY visit.    **It is very important for the vaccinations to be given on the scheduled day(s), this helps ensure you receive the full effectiveness of the vaccine.**    Please call [facility] with any questions [phone]    Thank you for visiting [facility]

## 2019-01-29 NOTE — NURSING NOTE
"Chief Complaint   Patient presents with     Travel Clinic     /73   Temp 98.2  F (36.8  C) (Oral)   Wt 77.6 kg (171 lb 1 oz)   BMI 25.08 kg/m   Estimated body mass index is 25.08 kg/m  as calculated from the following:    Height as of 11/26/18: 1.759 m (5' 9.25\").    Weight as of this encounter: 77.6 kg (171 lb 1 oz).  bp completed using cuff size: regular      Health Maintenance addressed:  NONE    n/a              "

## 2019-08-10 DIAGNOSIS — F34.1 DYSTHYMIC DISORDER: ICD-10-CM

## 2019-08-12 RX ORDER — CITALOPRAM HYDROBROMIDE 40 MG/1
TABLET ORAL
Qty: 90 TABLET | Refills: 0 | Status: SHIPPED | OUTPATIENT
Start: 2019-08-12 | End: 2019-11-18

## 2019-08-12 NOTE — TELEPHONE ENCOUNTER
"Routing refill request to provider for review/approval because: Per Protocol, RN cannot refill as PHQ-9 not current, last OV > 6 months ago    Called pt who says: \"I'll take a half a tab depending on how I feel.\" This order is written as follows: TAKE ONE-HALF TO ONE TABLETS BY MOUTH DAILY.   Says he was getting these on auto renewal. Says he is on it daily, and he is need of a refill.  Last OV 11/26/18:   (F34.1) Dysthymic disorder  Comment: Appears to be doing well on SSRI therapy.  Patient notes marked decrease in irritability.  Plan: citalopram (CELEXA) 40 MG tablet        Continue.  1 year refill.    Requested Prescriptions   Pending Prescriptions Disp Refills     citalopram (CELEXA) 40 MG tablet [Pharmacy Med Name: CITALOPRAM 40MG TABLETS] 90 tablet 0     Sig: TAKE 1/2 TO 1 TABLET BY MOUTH EVERY DAY       SSRIs Protocol Failed - 8/10/2019 11:15 AM        Failed - PHQ-9 score less than 5 in past 6 months     Please review last PHQ-9 score.           Failed - Recent (6 mo) or future (30 days) visit within the authorizing provider's specialty     Patient had office visit in the last 6 months or has a visit in the next 30 days with authorizing provider or within the authorizing provider's specialty.  See \"Patient Info\" tab in inbasket, or \"Choose Columns\" in Meds & Orders section of the refill encounter.            Passed - Medication is active on med list        Passed - Patient is age 18 or older          Last Written Prescription Date:  11/26/18  Last Fill Quantity: 90,  # refills: 0   Last office visit: 11/26/18 with prescribing provider:  Dr. Mccollum   Future Office Visit:      JOHN-7 SCORE 10/31/2016 11/1/2017 11/26/2018   Total Score - - -   Total Score 2 1 0     Emma TORREZ RN      "

## 2019-11-06 ENCOUNTER — HEALTH MAINTENANCE LETTER (OUTPATIENT)
Age: 44
End: 2019-11-06

## 2019-11-18 DIAGNOSIS — F34.1 DYSTHYMIC DISORDER: ICD-10-CM

## 2019-11-20 RX ORDER — CITALOPRAM HYDROBROMIDE 40 MG/1
TABLET ORAL
Qty: 90 TABLET | Refills: 0 | Status: SHIPPED | OUTPATIENT
Start: 2019-11-20 | End: 2020-02-17

## 2019-11-20 NOTE — TELEPHONE ENCOUNTER
Prescription approved per [facility] Refill Protocol.  Carmen Hernandez RN

## 2019-11-20 NOTE — TELEPHONE ENCOUNTER
"Requested Prescriptions   Pending Prescriptions Disp Refills     citalopram (CELEXA) 40 MG tablet [Pharmacy Med Name: CITALOPRAM 40MG TABLETS] 90 tablet 0     Sig: TAKE 1/2 TO 1 TABLET BY MOUTH EVERY DAY       SSRIs Protocol Failed - 11/18/2019  6:12 PM        Failed - PHQ-9 score less than 5 in past 6 months     Please review last PHQ-9 score.           Failed - Recent (6 mo) or future (30 days) visit within the authorizing provider's specialty     Patient had office visit in the last 6 months or has a visit in the next 30 days with authorizing provider or within the authorizing provider's specialty.  See \"Patient Info\" tab in inbasket, or \"Choose Columns\" in Meds & Orders section of the refill encounter.            Passed - Medication is active on med list        Passed - Patient is age 18 or older        Last Written Prescription Date:  8/12/19  Last Fill Quantity: 90,  # refills: 0   Last office visit: 1/29/2019 with prescribing provider:  Aislinn Mccollum     Future Office Visit:      "

## 2020-02-15 DIAGNOSIS — F34.1 DYSTHYMIC DISORDER: ICD-10-CM

## 2020-02-17 RX ORDER — CITALOPRAM HYDROBROMIDE 40 MG/1
TABLET ORAL
Qty: 90 TABLET | Refills: 0 | Status: SHIPPED | OUTPATIENT
Start: 2020-02-17 | End: 2020-05-20

## 2020-02-17 NOTE — TELEPHONE ENCOUNTER
Routing refill request to provider for review/approval because:  Patient needs to be seen because it has been more than 1 year since last office visit.  Marleen Reese RN

## 2020-02-17 NOTE — TELEPHONE ENCOUNTER
"Requested Prescriptions   Pending Prescriptions Disp Refills     citalopram (CELEXA) 40 MG tablet [Pharmacy Med Name: CITALOPRAM 40MG TABLETS] 90 tablet 0     Sig: TAKE 1/2 TO 1 TABLET BY MOUTH EVERY DAY       SSRIs Protocol Failed - 2/15/2020  3:16 PM        Failed - PHQ-9 score less than 5 in past 6 months     Please review last PHQ-9 score.           Failed - Recent (6 mo) or future (30 days) visit within the authorizing provider's specialty     Patient had office visit in the last 6 months or has a visit in the next 30 days with authorizing provider or within the authorizing provider's specialty.  See \"Patient Info\" tab in inbasket, or \"Choose Columns\" in Meds & Orders section of the refill encounter.            Passed - Medication is active on med list        Passed - Patient is age 18 or older        Last Written Prescription Date:  11/20/19  Last Fill Quantity: 90,  # refills: 0   Last office visit: 1/29/2019 with prescribing provider:  Aislinn Mccollum     Future Office Visit:      "

## 2020-04-30 ENCOUNTER — TELEPHONE (OUTPATIENT)
Dept: SCHEDULING | Facility: CLINIC | Age: 45
End: 2020-04-30

## 2020-04-30 NOTE — TELEPHONE ENCOUNTER
Pt stated that he needs a new prescription to get a new sleep machine. Pt hasn't been seen is 10+ years. Patient would like a call back to see what the next step is going to be.     Please call [phone]    Thanks!

## 2020-06-22 ENCOUNTER — VIRTUAL VISIT (OUTPATIENT)
Dept: FAMILY MEDICINE | Facility: CLINIC | Age: 45
End: 2020-06-22
Payer: COMMERCIAL

## 2020-06-22 DIAGNOSIS — Z87.19 HISTORY OF PROCTITIS: ICD-10-CM

## 2020-06-22 DIAGNOSIS — F34.1 DYSTHYMIC DISORDER: Primary | ICD-10-CM

## 2020-06-22 DIAGNOSIS — F10.21 ALCOHOL DEPENDENCE IN REMISSION (H): ICD-10-CM

## 2020-06-22 DIAGNOSIS — G47.33 OSA (OBSTRUCTIVE SLEEP APNEA): ICD-10-CM

## 2020-06-22 DIAGNOSIS — F17.290 OTHER TOBACCO PRODUCT NICOTINE DEPENDENCE, UNCOMPLICATED: ICD-10-CM

## 2020-06-22 PROCEDURE — 99214 OFFICE O/P EST MOD 30 MIN: CPT | Mod: TEL | Performed by: FAMILY MEDICINE

## 2020-06-22 RX ORDER — CITALOPRAM HYDROBROMIDE 40 MG/1
TABLET ORAL
Qty: 90 TABLET | Refills: 3 | Status: SHIPPED | OUTPATIENT
Start: 2020-06-22 | End: 2021-06-15

## 2020-06-22 ASSESSMENT — PATIENT HEALTH QUESTIONNAIRE - PHQ9: SUM OF ALL RESPONSES TO PHQ QUESTIONS 1-9: 6

## 2020-06-22 NOTE — PROGRESS NOTES
"Bernabe Garcia is a 45 year old male who is being evaluated via a billable telephone visit.      The patient has been notified of following:     \"This telephone visit will be conducted via a call between you and your physician/provider. We have found that certain health care needs can be provided without the need for a physical exam.  This service lets us provide the care you need with a short phone conversation.  If a prescription is necessary we can send it directly to your pharmacy.  If lab work is needed we can place an order for that and you can then stop by our lab to have the test done at a later time.    Telephone visits are billed at different rates depending on your insurance coverage. During this emergency period, for some insurers they may be billed the same as an in-person visit.  Please reach out to your insurance provider with any questions.    If during the course of the call the physician/provider feels a telephone visit is not appropriate, you will not be charged for this service.\"    Patient has given verbal consent for Telephone visit?  Yes    What phone number would you like to be contacted at? [phone]    How would you like to obtain your AVS? Katie Bass     Bernabe Garcia is a 45 year old male who presents via phone visit today for the following health issues:    HPI  Depression Followup    How are you doing with your depression since your last visit? No change/stable    Are you having other symptoms that might be associated with depression? No    Have you had a significant life event?  No     Are you feeling anxious or having panic attacks?   No    Do you have any concerns with your use of alcohol or other drugs? No    Social History     Tobacco Use     Smoking status: Never Smoker     Smokeless tobacco: Former User     Tobacco comment: chew   Substance Use Topics     Alcohol use: No     Comment: quit 4/09     Drug use: No     PHQ 11/1/2017 11/26/2018 6/22/2020   PHQ-9 Total Score 4 " 2 6   Q9: Thoughts of better off dead/self-harm past 2 weeks Not at all Not at all Not at all     JOHN-7 SCORE 10/31/2016 11/1/2017 11/26/2018   Total Score - - -   Total Score 2 1 0     Last PHQ-9 6/22/2020   1.  Little interest or pleasure in doing things 1   2.  Feeling down, depressed, or hopeless 0   3.  Trouble falling or staying asleep, or sleeping too much 3   4.  Feeling tired or having little energy 1   5.  Poor appetite or overeating 1   6.  Feeling bad about yourself 0   7.  Trouble concentrating 0   8.  Moving slowly or restless 0   Q9: Thoughts of better off dead/self-harm past 2 weeks 0   PHQ-9 Total Score 6   Difficulty at work, home, or with people Somewhat difficult         Suicide Assessment Five-step Evaluation and Treatment (SAFE-T)      How many servings of fruits and vegetables do you eat daily?  0-1    On average, how many sweetened beverages do you drink each day (Examples: soda, juice, sweet tea, etc.  Do NOT count diet or artificially sweetened beverages)?   0    How many days per week do you exercise enough to make your heart beat faster? 4    How many minutes a day do you exercise enough to make your heart beat faster? 20 - 29    How many days per week do you miss taking your medication? 0          Patient Active Problem List   Diagnosis     Dysthymic disorder     Erectile dysfunction     Family history of ischemic heart disease     Alcohol dependence in remission (H)     CARDIOVASCULAR SCREENING; LDL GOAL LESS THAN 160     History of proctitis     Other tobacco product nicotine dependence, uncomplicated     DAKOTA (obstructive sleep apnea)     Past Surgical History:   Procedure Laterality Date     COLONOSCOPY N/A 2/3/2016    Procedure: COMBINED COLONOSCOPY, SINGLE OR MULTIPLE BIOPSY/POLYPECTOMY BY BIOPSY;  Surgeon: Charlie Rosa MD;  Location: [facility]     SURGICAL HISTORY OF -   1995    Uvalectomy     VASECTOMY  2005       Social History     Tobacco Use     Smoking status: Never  Smoker     Smokeless tobacco: Former User     Tobacco comment: chew   Substance Use Topics     Alcohol use: No     Comment: quit      Family History   Problem Relation Age of Onset     Family History Negative Mother      Family History Negative Father      C.A.D. Maternal Grandfather          in his mid 50's, of an MI     C.A.D. Paternal Grandfather          in his early 50's of an MI         Current Outpatient Medications   Medication Sig Dispense Refill     citalopram (CELEXA) 40 MG tablet TAKE 1/2 TO 1 TABLET BY MOUTH EVERY DAY. 90 tablet 3     nicotine (NICODERM CQ) 14 MG/24HR patch 2h hr Place 1 patch onto the skin every 24 hours       nicotine (NICORETTE) 2 MG gum Place 2 mg inside cheek as needed for smoking cessation         Reviewed and updated as needed this visit by Provider         Review of Systems   CONSTITUTIONAL: NEGATIVE for fever, chills, change in weight  ENT/MOUTH: NEGATIVE for ear, mouth and throat problems  RESP: NEGATIVE for significant cough or SOB  CV: NEGATIVE for chest pain, palpitations or peripheral edema  GI: Intermittent bright red rectal bleeding.  PSYCHIATRIC: NEGATIVE for changes in mood or affect       Objective   Reported vitals:  There were no vitals taken for this visit.   healthy, alert. His affect is normal  RESP: No cough, no audible wheezing, able to talk in full sentences  Remainder of exam unable to be completed due to telephone visits    Diagnostic Test Results:  Labs reviewed in Epic  none         Assessment/Plan:      (F34.1) Dysthymic disorder  (primary encounter diagnosis)  Comment: Appears to be doing well on SSRI therapy.  Plan: citalopram (CELEXA) 40 MG tablet        Continue.  1 year refill.    (G47.33) DAKOTA (obstructive sleep apnea)  Comment: Diagnosed approximately 15 years ago, is currently using his son's machine with inconsistent results.  Advised that he should establish with our sleep center and obtain a sleep apnea machine of his own.  Plan:  SLEEP EVALUATION & MANAGEMENT REFERRAL - ADULT         -[facility] - [facility]          [phone] (Age 15 and up)        Referral placed.    (F10.21) Alcohol dependence in remission (H)  Comment: States he is maintaining sobriety x5 years.  Plan: Monitor.    (Z87.19) History of proctitis  Comment: 2016, he presented with rectal bleeding.  Colonoscopy showed acute proctitis, no polyps.  Basically, his symptoms have remained unchanged since that time.  If he uses dry toilet paper or eat spicy food, he will have some rectal bleeding.  Otherwise, he is asymptomatic.  Denies abdominal pain, weight loss.  Plan: For now, advised that he monitor, repeat colonoscopy in 2026 for colon cancer screening.  If he has more rectal bleeding, advised to repeat colonoscopy.    (F17.290) Other tobacco product nicotine dependence, uncomplicated  Comment: Patient is still using transdermal nicotine patches and gum for nicotine addiction.  Denies chewing tobacco or cigarette use.  Plan: Reviewed that at some point, he will need to get off nicotine replacement therapy.      Return in about 1 year (around 6/22/2021) for Routine Visit.      Phone call duration:  22minutes    Aislinn Mccollum MD

## 2020-06-22 NOTE — PATIENT INSTRUCTIONS
*   I refilled you Celexa for one year.     *   For getting a new CPAP, you'll need to see a sleep center. [phone].     *   For now, it's okay to monitor the rectal bleeding. If there's a change, please update me.     *   At some point, you'll need to stop the nicotine supplements.     *   Follow up appointment in one year.     *    Call or My Chart with any questions or concerns.

## 2020-07-07 VITALS — BODY MASS INDEX: 27.2 KG/M2 | HEIGHT: 70 IN | WEIGHT: 190 LBS

## 2020-07-07 ASSESSMENT — MIFFLIN-ST. JEOR: SCORE: 1743.08

## 2020-07-07 NOTE — PROGRESS NOTES
"Bernabe Garcia is a 45 year old male who is being evaluated via a billable video visit.      The patient has been notified of following:     \"This video visit will be conducted via a call between you and your physician/provider. We have found that certain health care needs can be provided without the need for an in-person physical exam.  This service lets us provide the care you need with a video conversation.  If a prescription is necessary we can send it directly to your pharmacy.  If lab work is needed we can place an order for that and you can then stop by our lab to have the test done at a later time.    Video visits are billed at different rates depending on your insurance coverage.  Please reach out to your insurance provider with any questions.    If during the course of the call the physician/provider feels a video visit is not appropriate, you will not be charged for this service.\"    Patient has given verbal consent for Video visit? Yes  How would you like to obtain your AVS? MyChart    Will anyone else be joining your video visit? No      Andres Jarrell MD on 7/7/2020 at 2:34 PM      Video-Visit Details    Type of service:  Video Visit    Video Start Time: 1:01 PM  Video End Time: 1:51 PM    Originating Location (pt. Location): Home    Distant Location (provider location):  [facility]    Platform used for Video Visit: [employer]        Sleep Consultation:    Date on this visit: 7/8/2020    Bernabe Garcia is sent by Aislinn Mccollum for a sleep consultation regarding obstructive sleep apnea.    Primary Physician: Aislinn Mccollum     Chief Complaint   Patient presents with     Sleep Problem     Diagnosed with sleep apnea. Using son's old cpap.       He initially presented with loud socially disruptive snoring, un-refreshing sleep,  family history of obstructive sleep apnea. He had a sleep study in early 2000s at sleep center in [place], and was told he had bad obstructive sleep apnea. He thinks he may have stopped " breathing > 50 times an hour. His sleep study is no longer available for review. He started on CPAP (maybe 11 or 12). He followed up for a while, but then stopped. His machine  and then he switched to his wife's APAP. That machine  and he eventually started using his son's old CPAP machine. But he thinks the settings are too low and he is not feeling rested with it. .      He thinks his weight is similar, varying from 165-195 over past 15 years.     He tried an over-the-counter mandibular advancement device but found it ineffective.     Overall, the patient rates their experience with PAP as good. He does not like wearing the mask. He does not like water going up his nose.     He likes it because it helps him feel more rested    Patient uses nasal pillows.    Antigo Sleepiness Scale:   STONEY Total Score: 10       Respironics  CPAP 7 cmH2O download:  29 total days of use. 1 nonuse days.  Average use 8 hours 13 minutes per day.  97% days with >4 hours use.  Large leak 54 per day average.  AHI 7.2.       Bernabe goes to bed at 11:00 PM during the week. He gets up at 8:00 AM with an alarm. He falls asleep in 60 minutes.  Bernabe has difficulty falling asleep. He has had problems falling asleep his 'whole life' He states he is thinking, mind won't stop.  He wakes up 1-2 times a week.  On weekends, Bernabe goes to bed  at 12:00 (11:30-2) AM.  He gets up at 10:00 AM with an alarm.     Patient does use electronics in bed and does not watch TV in bed.    He listens to an audio and plays Xymogenire for 30 minutes.     Patient does have a regular bed partner. Patient sleeps on his back and side. He denies no morning headaches.    He has restless legs a few times a month and it will interfere with sleep. He feels he has to move them or they will hurt, and then when he stops it will come. It occurs only at night in bed.     Bernabe denies any sleep walking, sleep talking and dream enactment.    Bernabe denies reflux at  night.      Bernabe naps rarely. He takes rare inadvertant naps.  He denies dozing while driving.  He uses 2 cups/day of coffee, 2 sodas/day. Last caffeine intake is usually before noon.      Allergies:    Allergies   Allergen Reactions     No Known Drug Allergies        Medications:    Current Outpatient Medications   Medication Sig Dispense Refill     citalopram (CELEXA) 40 MG tablet TAKE 1/2 TO 1 TABLET BY MOUTH EVERY DAY. 90 tablet 3     nicotine (NICODERM CQ) 14 MG/24HR patch 2h hr Place 1 patch onto the skin every 24 hours       nicotine (NICORETTE) 2 MG gum Place 2 mg inside cheek as needed for smoking cessation         Problem List:  Patient Active Problem List    Diagnosis Date Noted     Other tobacco product nicotine dependence, uncomplicated 06/22/2020     Priority: Medium     DAKOTA (obstructive sleep apnea) 06/22/2020     Priority: Medium     Alcohol dependence in remission (H) 11/20/2009     Priority: Medium     January 18, 2012 stable        Dysthymic disorder 10/23/2007     Priority: Medium     June 4, 2008 improved with higher dose of Celexa, 40 mg daily. Stills notes alcohol binge drinking, bipolar cycles. Will increase Celexa to 60 mg daily. Follow up one month. If stablized, continue, follow up 6 months. If not, consider adding another med to address the bipolar portion. Advised to stop using alcohol and address this issue through counseling.   January 18, 2012 doing well on just SSRI, refills for 6 months.        History of proctitis 06/22/2020     Priority: Low     June 22, 2020        CARDIOVASCULAR SCREENING; LDL GOAL LESS THAN 160 10/31/2010     Priority: Low     Erectile dysfunction 10/23/2007     Priority: Low     October 23, 2007 probably secondary to SSRI, will try prn Levitra. No contraindications.   June 4, 2008 improved.        Family history of ischemic heart disease 10/23/2007     Priority: Low     October 23, 2007 grandfather with a history of MI in their mid 50's.             Past  Medical/Surgical History:  No past medical history on file.  Past Surgical History:   Procedure Laterality Date     COLONOSCOPY N/A 2/3/2016    Procedure: COMBINED COLONOSCOPY, SINGLE OR MULTIPLE BIOPSY/POLYPECTOMY BY BIOPSY;  Surgeon: Charlie Rosa MD;  Location: [facility]     ENT SURGERY  1995    Uvulectomy     TONSILLECTOMY  1975     VASECTOMY  2005       Social History:  Social History     Socioeconomic History     Marital status:      Spouse name: Priti     Number of children: 2     Years of education: Not on file     Highest education level: Not on file   Occupational History     Occupation: Computer programer     Employer: [employer]   Social Needs     Financial resource strain: Not on file     Food insecurity     Worry: Not on file     Inability: Not on file     Transportation needs     Medical: Not on file     Non-medical: Not on file   Tobacco Use     Smoking status: Never Smoker     Smokeless tobacco: Former User     Tobacco comment: chew   Substance and Sexual Activity     Alcohol use: No     Comment: quit 4/09     Drug use: No     Sexual activity: Yes     Partners: Female   Lifestyle     Physical activity     Days per week: Not on file     Minutes per session: Not on file     Stress: Not on file   Relationships     Social connections     Talks on phone: Not on file     Gets together: Not on file     Attends Yazdanism service: Not on file     Active member of club or organization: Not on file     Attends meetings of clubs or organizations: Not on file     Relationship status: Not on file     Intimate partner violence     Fear of current or ex partner: Not on file     Emotionally abused: Not on file     Physically abused: Not on file     Forced sexual activity: Not on file   Other Topics Concern     Parent/sibling w/ CABG, MI or angioplasty before 65F 55M? Not Asked   Social History Narrative     Not on file       Family History:  Family History   Problem Relation Age of Onset      "Family History Negative Mother      Family History Negative Father      GENTRY.A.SAMI. Maternal Grandfather          in his mid 50's, of an MI     C.A.D. Paternal Grandfather          in his early 50's of an MI       Review of Systems:  A complete review of systems reviewed by me is negative with the exeption of what has been mentioned in the history of present illness.  CONSTITUTIONAL: NEGATIVE for weight gain/loss, fever, chills, sweats or night sweats, drug allergies.  EYES: NEGATIVE for changes in vision, blind spots, double vision.  ENT: NEGATIVE for ear pain, sore throat, sinus pain, post-nasal drip, runny nose, bloody nose  CARDIAC: NEGATIVE for fast heartbeats or fluttering in chest, chest pain or pressure, breathlessness when lying flat, swollen legs or swollen feet.  NEUROLOGIC: NEGATIVE headaches, weakness or numbness in the arms or legs.  DERMATOLOGIC: NEGATIVE for rashes, new moles or change in mole(s)  PULMONARY: NEGATIVE SOB at rest, SOB with activity, dry cough, productive cough, coughing up blood, wheezing or whistling when breathing.    GASTROINTESTINAL: NEGATIVE for nausea or vomitting, loose or watery stools, fat or grease in stools, constipation, abdominal pain, bowel movements black in color or blood noted.  GENITOURINARY: NEGATIVE for pain during urination, blood in urine, urinating more frequently than usual, irregular menstrual periods.  MUSCULOSKELETAL: NEGATIVE for muscle pain, bone or joint pain, swollen joints.  ENDOCRINE: NEGATIVE for increased thirst or urination, diabetes.  LYMPHATIC: NEGATIVE for swollen lymph nodes, lumps or bumps in the breasts or nipple discharge.    Physical Examination:  Vitals: Ht 1.778 m (5' 10\")   Wt 86.2 kg (190 lb)   BMI 27.26 kg/m    BMI= Body mass index is 27.26 kg/m .         Stella Total Score 2020   Total score - Stella 5       STONEY Total Score: 10 (20 1300)    SpO2 Readings from Last 4 Encounters:   18 99%   16 (P) 99% "       GENERAL APPEARANCE: alert and no distress  EYES: Eyes grossly normal to inspection  HENT: mouth without ulcers or lesions  NECK: not generous size  LUNGS: no shortness of breath, cough  NEURO: mentation intact, speech normal and cranial nerves 2-12 appear intact  PSYCH: affect normal/bright      Impression/Plan:        Obstructive sleep apnea  Unknown severity by sleep study early 2000s. Tolerating PAP well with mildly elevated AHI on download and symptoms of mildly un-refreshing sleep.   - Suggested he change pressure to 9 cmH20  - Recommend HSAT to reassess severity, document need for machine and supplies    Psychophysiologic insomnia   Sleep onset difficulties. We discussed stimuus control.  - Read the book Say Good Night To Insomnia      Mild restless leg syndrome symptoms  Celexa may cause restless leg syndrome symptoms   - Suggested a trial of iron supplement    He will follow up with me in approximately two weeks after his sleep study has been competed to review the results and discuss plan of care.       Polysomnography reviewed.  Obstructive sleep apnea reviewed.  Complications of untreated sleep apnea were reviewed.    Andres Jarrell MD     CC: Aislinn Mccollum

## 2020-07-07 NOTE — PATIENT INSTRUCTIONS
Your BMI is Body mass index is 27.26 kg/m .  Weight management is a personal decision.  If you are interested in exploring weight loss strategies, the following discussion covers the approaches that may be successful. Body mass index (BMI) is one way to tell whether you are at a healthy weight, overweight, or obese. It measures your weight in relation to your height.  A BMI of 18.5 to 24.9 is in the healthy range. A person with a BMI of 25 to 29.9 is considered overweight, and someone with a BMI of 30 or greater is considered obese. More than two-thirds of American adults are considered overweight or obese.  Being overweight or obese increases the risk for further weight gain. Excess weight may lead to heart disease and diabetes.  Creating and following plans for healthy eating and physical activity may help you improve your health.  Weight control is part of healthy lifestyle and includes exercise, emotional health, and healthy eating habits. Careful eating habits lifelong are the mainstay of weight control. Though there are significant health benefits from weight loss, long-term weight loss with diet alone may be very difficult to achieve- studies show long-term success with dietary management in less than 10% of people. Attaining a healthy weight may be especially difficult to achieve in those with severe obesity. In some cases, medications, devices and surgical management might be considered.  What can you do?  If you are overweight or obese and are interested in methods for weight loss, you should discuss this with your provider.     Consider reducing daily calorie intake by 500 calories.     Keep a food journal.     Avoiding skipping meals, consider cutting portions instead.    Diet combined with exercise helps maintain muscle while optimizing fat loss. Strength training is particularly important for building and maintaining muscle mass. Exercise helps reduce stress, increase energy, and improves fitness.  Increasing exercise without diet control, however, may not burn enough calories to loose weight.       Start walking three days a week 10-20 minutes at a time    Work towards walking thirty minutes five days a week     Eventually, increase the speed of your walking for 1-2 minutes at time    In addition, we recommend that you review healthy lifestyles and methods for weight loss available through the National Institutes of Health patient information sites:  http://win.niddk.nih.gov/publications/index.htm    And look into health and wellness programs that may be available through your health insurance provider, employer, local community center, or [employer]ber[employer] club.    Weight management plan: Patient was referred to their PCP to discuss a diet and exercise plan.    Read the book Say Good Night To Insomnia

## 2020-07-08 ENCOUNTER — VIRTUAL VISIT (OUTPATIENT)
Dept: SLEEP MEDICINE | Facility: CLINIC | Age: 45
End: 2020-07-08
Attending: FAMILY MEDICINE
Payer: COMMERCIAL

## 2020-07-08 DIAGNOSIS — F51.04 CHRONIC INSOMNIA: ICD-10-CM

## 2020-07-08 DIAGNOSIS — G47.33 OSA (OBSTRUCTIVE SLEEP APNEA): Primary | ICD-10-CM

## 2020-07-08 PROCEDURE — 99204 OFFICE O/P NEW MOD 45 MIN: CPT | Mod: 95 | Performed by: INTERNAL MEDICINE

## 2020-07-29 ENCOUNTER — OFFICE VISIT (OUTPATIENT)
Dept: SLEEP MEDICINE | Facility: CLINIC | Age: 45
End: 2020-07-29
Payer: COMMERCIAL

## 2020-07-29 DIAGNOSIS — F51.04 CHRONIC INSOMNIA: ICD-10-CM

## 2020-07-29 DIAGNOSIS — G47.33 OSA (OBSTRUCTIVE SLEEP APNEA): Primary | ICD-10-CM

## 2020-07-29 PROCEDURE — G0399 HOME SLEEP TEST/TYPE 3 PORTA: HCPCS | Performed by: INTERNAL MEDICINE

## 2020-07-29 NOTE — PROGRESS NOTES
Pt is completing a home sleep test. Pt was instructed on how to put on the Noxturnal T3 device and associated equipment before going to bed and given the opportunity to practice putting it on before leaving the sleep center. Pt was reminded to bring the home sleep test kit back to the center tomorrow, at agreed upon time for download and reporting.      [FreeTextEntry1] : 12/2/19: Patient presents today for a follow up. Pt states he sees his oncologist once a month to get blood work. Patients hematocrit numbers are from  3/19 was 48.6, 6/19 was 48.4 and 8/19 was 40.1. Left lower lobe colon cancer excised and remains cancer free. CT chest and abdomen from 10/16/19 shows  thyroid nodules, stable 3mm left upper lobe nodule, right subcutaneous port, prostate was 5.9 cm. Patient denies dysuria, gross hematuria, urgency, or incontinence, pushing or straining. Pt states he wakes up about 1-2 times at night to urinate if he drinks a lot of tea before bed. \par \par The patient produced a urine sample which will be sent for urinalysis, urine cytology and urine culture. \par Blood work today included comprehensive metabolic panel, CBC, PSA and testosterone. \par \par He is to follow up in 6 months or sooner if clinically indicated.

## 2020-07-30 ENCOUNTER — DOCUMENTATION ONLY (OUTPATIENT)
Dept: SLEEP MEDICINE | Facility: CLINIC | Age: 45
End: 2020-07-30
Payer: COMMERCIAL

## 2020-07-30 DIAGNOSIS — F51.04 CHRONIC INSOMNIA: ICD-10-CM

## 2020-07-30 DIAGNOSIS — G47.33 OSA (OBSTRUCTIVE SLEEP APNEA): ICD-10-CM

## 2020-07-30 NOTE — PROGRESS NOTES
HST POST-STUDY QUESTIONNAIRE    1. What time did you go to bed?  11:30  2. How long do you think it took to fall asleep?  30 min  3. What time did you wake up to start the day?  8:00  4. Did you get up during the night at all?  No  5. If you woke up, do you remember approximately what time(s)?   6. Did you have any difficulty with the equipment?  No  7. Did you us any type of treatment with this study?  None  8. Was the head of the bed elevated? No  9. Did you sleep in a recliner?  No  10. Did you stop using CPAP at least 3 days before this test?  Yes  11. Any other information you'd like us to know?

## 2020-07-30 NOTE — PROGRESS NOTES
This HSAT was performed using a Noxturnal T3 device which recorded snore, sound, movement activity, body position, nasal pressure, oronasal thermal airflow, pulse, oximetry and both chest and abdominal respiratory effort. HSAT data was restricted to the time patient states they were in bed.     HSAT was scored using 1B 4% hypopnea rule.     HST AHI (Non-PAT): 30.6  Snoring was reported as loud.  Time with SpO2 below 89% was 11 minutes.   Overall signal quality was good     Pt will follow up with sleep provider to determine appropriate therapy.

## 2020-08-03 NOTE — PROGRESS NOTES
Called patient with results  He has increased pressures to 12 and he feels like he is doing better  Recommend getting download after on 12 cm after 30 days

## 2020-08-03 NOTE — PROCEDURES
"HOME SLEEP STUDY INTERPRETATION    Patient: Bernabe Garcia  MRN: [id number]  YOB: 1975  Study Date: 7/29/2020  Referring Provider: Aislinn Mccollum  Ordering Provider: Andres Jarrell MD     Indications for Home Study: Bernabe Garcia is a 45 year old male with a history of obstructive sleep apnea     Estimated body mass index is 27.26 kg/m  as calculated from the following:    Height as of 7/7/20: 1.778 m (5' 10\").    Weight as of 7/7/20: 86.2 kg (190 lb).  Total score - [place]: 5 (7/8/2020  1:00 PM)      Data: A full night home sleep study was performed recording the standard physiologic parameters including body position, movement, sound, nasal pressure, thermal oral airflow, chest and abdominal movements with respiratory inductance plethysmography, and oxygen saturation by pulse oximetry. Pulse rate was estimated by oximetry recording. This study was considered adequate based on > 4 hours of quality oximetry and respiratory recording. As specified by the AASM Manual for the Scoring of Sleep and Associated events, version 2.3, Rule VIII.D 1B, 4% oxygen desaturation scoring for hypopneas is used as a standard of care on all home sleep apnea testing.    Analysis Time:  470 minutes    Respiration:   Sleep Associated Hypoxemia: episodic hypoxemia was present. Baseline oxygen saturation was 93%.  Time with saturation less than or equal to 88% was 11 minutes. The lowest oxygen saturation was 83%.   Snoring: Snoring was present.  Respiratory events: The home study revealed a presence of 187 obstructive apneas and 1 mixed and central apneas. There were 52 hypopneas resulting in a combined apnea/hypopnea index [AHI] of 30.6 events per hour.  AHI was 34.9 per hour supine, n/a per hour prone, 26 per hour on left side, and 25 per hour on right side.   Pattern: Excluding events noted above, respiratory rate and pattern was Normal.    Position: Percent of time spent: supine - 52%, prone - 0%, on left - 16%, on right - " 31%.    Heart Rate: By pulse oximetry normal rate was noted.     Assessment:   Severe obstructive sleep apnea.  Sleep associated hypoxemia was present.    Recommendations:  Continue CPAP use  Suggest optimizing sleep hygiene and avoiding sleep deprivation.  Weight management.    Diagnosis Code(s): Obstructive Sleep Apnea G47.33, Hypoxemia G47.36    Andres aJrrell MD, August 3, 2020   Diplomate, American Board of Internal Medicine, Sleep Medicine

## 2020-08-07 ENCOUNTER — TELEPHONE (OUTPATIENT)
Dept: SLEEP MEDICINE | Facility: CLINIC | Age: 45
End: 2020-08-07

## 2020-08-07 DIAGNOSIS — G47.33 OSA (OBSTRUCTIVE SLEEP APNEA): ICD-10-CM

## 2020-08-07 DIAGNOSIS — F51.04 CHRONIC INSOMNIA: ICD-10-CM

## 2020-08-14 VITALS — HEIGHT: 70 IN | WEIGHT: 190 LBS | BODY MASS INDEX: 27.2 KG/M2

## 2020-08-14 ASSESSMENT — MIFFLIN-ST. JEOR: SCORE: 1743.08

## 2020-08-14 NOTE — PATIENT INSTRUCTIONS
Your BMI is Body mass index is 27.26 kg/m .  Weight management is a personal decision.  If you are interested in exploring weight loss strategies, the following discussion covers the approaches that may be successful. Body mass index (BMI) is one way to tell whether you are at a healthy weight, overweight, or obese. It measures your weight in relation to your height.  A BMI of 18.5 to 24.9 is in the healthy range. A person with a BMI of 25 to 29.9 is considered overweight, and someone with a BMI of 30 or greater is considered obese. More than two-thirds of American adults are considered overweight or obese.  Being overweight or obese increases the risk for further weight gain. Excess weight may lead to heart disease and diabetes.  Creating and following plans for healthy eating and physical activity may help you improve your health.  Weight control is part of healthy lifestyle and includes exercise, emotional health, and healthy eating habits. Careful eating habits lifelong are the mainstay of weight control. Though there are significant health benefits from weight loss, long-term weight loss with diet alone may be very difficult to achieve- studies show long-term success with dietary management in less than 10% of people. Attaining a healthy weight may be especially difficult to achieve in those with severe obesity. In some cases, medications, devices and surgical management might be considered.  What can you do?  If you are overweight or obese and are interested in methods for weight loss, you should discuss this with your provider.     Consider reducing daily calorie intake by 500 calories.     Keep a food journal.     Avoiding skipping meals, consider cutting portions instead.    Diet combined with exercise helps maintain muscle while optimizing fat loss. Strength training is particularly important for building and maintaining muscle mass. Exercise helps reduce stress, increase energy, and improves fitness.  Increasing exercise without diet control, however, may not burn enough calories to loose weight.       Start walking three days a week 10-20 minutes at a time    Work towards walking thirty minutes five days a week     Eventually, increase the speed of your walking for 1-2 minutes at time    In addition, we recommend that you review healthy lifestyles and methods for weight loss available through the National Institutes of Health patient information sites:  http://win.niddk.nih.gov/publications/index.htm    And look into health and wellness programs that may be available through your health insurance provider, employer, local community center, or [employer].    Weight management plan: Patient was referred to their PCP to discuss a diet and exercise plan.

## 2020-08-14 NOTE — PROGRESS NOTES
"Bernabe Garcia is a 45 year old male who is being evaluated via a billable video visit.      The patient has been notified of following:     \"This video visit will be conducted via a call between you and your physician/provider. We have found that certain health care needs can be provided without the need for an in-person physical exam.  This service lets us provide the care you need with a video conversation.  If a prescription is necessary we can send it directly to your pharmacy.  If lab work is needed we can place an order for that and you can then stop by our lab to have the test done at a later time.    Video visits are billed at different rates depending on your insurance coverage.  Please reach out to your insurance provider with any questions.    If during the course of the call the physician/provider feels a video visit is not appropriate, you will not be charged for this service.\"    Patient has given verbal consent for Video visit? Yes  How would you like to obtain your AVS? MyChart  If you are dropped from the video visit, the video invite should be resent to: Send to e-mail at: [email or web address]  Will anyone else be joining your video visit? No        Video-Visit Details    Type of service:  Video Visit    Video Start Time: 10:25 AM  Video End Time: 10:40 AM    Originating Location (pt. Location): Home    Distant Location (provider location):  [facility]     Platform used for Video Visit: Lauren Bello        Home Sleep Apnea Testing Results Visit:    Chief Complaint   Patient presents with     Study Results       Bernabe Garcia is a 45 year old male after having had Home Sleep Apnea Testing.  He presented 7/2020 with history of Obstructive sleep apnea, unknown severity by sleep study early 2000s. Tolerating PAP well with mildly elevated AHI on download and symptoms of mildly un-refreshing sleep.   - Psychophysiologic insomnia, sleep onset difficulties.   - Mild restless leg syndrome " "symptoms    - Suggested he change pressure to 9 cmH20    Estimated body mass index is 27.26 kg/m  as calculated from the following:    Height as of this encounter: 1.778 m (5' 10\").    Weight as of this encounter: 86.2 kg (190 lb).  Total score - [place]: 2 (8/14/2020  1:04 PM)       Home Sleep Apnea Testing - 7/29/2020: 190 lbs 0 oz: AHI 30.6/hr. Supine AHI 34.9/hr.   Oxygen Luk[place] of 83%.  Baseline 93%.  Sp02 =< 88% for 11 minutes  He slept on his back (52%), prone (0%), left (16) and right (31%) sides.   Analysis time: 470 minutes.     He thinks he was off CPAP 2 days before HST    Bernabe Garcia reports that he slept Fair     Since last evaluation by myself he has had his pressure increased, he is more refreshed in the morning    He says insomnia is occurring infrequently.       Past medical/surgical history, family history, social history, medications and allergies were reviewed.      Ht 1.778 m (5' 10\")   Wt 86.2 kg (190 lb)   BMI 27.26 kg/m        Impression/Plan:  Severe Obstructive Sleep Apnea.   Sleep associated hypoxemia was present.     -Continue current treatments.     15 minutes spent with patient with >50% spent in counseling and coordination of care.                  "

## 2020-08-17 ENCOUNTER — VIRTUAL VISIT (OUTPATIENT)
Dept: SLEEP MEDICINE | Facility: CLINIC | Age: 45
End: 2020-08-17
Payer: COMMERCIAL

## 2020-08-17 DIAGNOSIS — G47.33 OSA (OBSTRUCTIVE SLEEP APNEA): ICD-10-CM

## 2020-08-17 DIAGNOSIS — F51.04 CHRONIC INSOMNIA: ICD-10-CM

## 2020-08-17 PROCEDURE — 99213 OFFICE O/P EST LOW 20 MIN: CPT | Mod: 95 | Performed by: INTERNAL MEDICINE

## 2020-08-17 NOTE — TELEPHONE ENCOUNTER
.Chart reviewed.   Immunizations: updated  Orders placed: n/a  Upcoming appts to satisfy ASHLEY topics: Mammogram 8/18, Optometry 9/02        Called and lvm for pt to go over transfer process to [facility].

## 2020-11-29 ENCOUNTER — HEALTH MAINTENANCE LETTER (OUTPATIENT)
Age: 45
End: 2020-11-29

## 2021-03-12 ENCOUNTER — HOSPITAL ENCOUNTER (EMERGENCY)
Facility: CLINIC | Age: 46
Discharge: HOME | End: 2021-03-12
Attending: EMERGENCY MEDICINE | Admitting: EMERGENCY MEDICINE
Payer: COMMERCIAL

## 2021-03-12 ENCOUNTER — APPOINTMENT (OUTPATIENT)
Dept: CT IMAGING | Facility: CLINIC | Age: 46
End: 2021-03-12
Attending: EMERGENCY MEDICINE
Payer: COMMERCIAL

## 2021-03-12 VITALS
RESPIRATION RATE: 16 BRPM | HEART RATE: 77 BPM | BODY MASS INDEX: 27.26 KG/M2 | OXYGEN SATURATION: 100 % | DIASTOLIC BLOOD PRESSURE: 81 MMHG | SYSTOLIC BLOOD PRESSURE: 126 MMHG | HEIGHT: 70 IN | TEMPERATURE: 98.1 F

## 2021-03-12 DIAGNOSIS — N20.1 CALCULUS OF URETEROVESICAL JUNCTION (UVJ): ICD-10-CM

## 2021-03-12 LAB
ALBUMIN SERPL-MCNC: 4.4 G/DL (ref 3.4–5)
ALBUMIN UR-MCNC: NEGATIVE MG/DL
ALP SERPL-CCNC: 50 U/L (ref 40–150)
ALT SERPL W P-5'-P-CCNC: 24 U/L (ref 0–70)
ANION GAP SERPL CALCULATED.3IONS-SCNC: 6 MMOL/L (ref 3–14)
APPEARANCE UR: CLEAR
AST SERPL W P-5'-P-CCNC: 20 U/L (ref 0–45)
BASOPHILS # BLD AUTO: 0 10E9/L (ref 0–0.2)
BASOPHILS NFR BLD AUTO: 0.7 %
BILIRUB SERPL-MCNC: 0.8 MG/DL (ref 0.2–1.3)
BILIRUB UR QL STRIP: NEGATIVE
BUN SERPL-MCNC: 13 MG/DL (ref 7–30)
CALCIUM SERPL-MCNC: 9.4 MG/DL (ref 8.5–10.1)
CHLORIDE SERPL-SCNC: 103 MMOL/L (ref 94–109)
CO2 SERPL-SCNC: 27 MMOL/L (ref 20–32)
COLOR UR AUTO: ABNORMAL
CREAT SERPL-MCNC: 1.11 MG/DL (ref 0.66–1.25)
DIFFERENTIAL METHOD BLD: NORMAL
EOSINOPHIL # BLD AUTO: 0.1 10E9/L (ref 0–0.7)
EOSINOPHIL NFR BLD AUTO: 1.5 %
ERYTHROCYTE [DISTWIDTH] IN BLOOD BY AUTOMATED COUNT: 12.5 % (ref 10–15)
GFR SERPL CREATININE-BSD FRML MDRD: 78 ML/MIN/{1.73_M2}
GLUCOSE SERPL-MCNC: 105 MG/DL (ref 70–99)
GLUCOSE UR STRIP-MCNC: NEGATIVE MG/DL
HCT VFR BLD AUTO: 46.2 % (ref 40–53)
HGB BLD-MCNC: 15.9 G/DL (ref 13.3–17.7)
HGB UR QL STRIP: ABNORMAL
IMM GRANULOCYTES # BLD: 0 10E9/L (ref 0–0.4)
IMM GRANULOCYTES NFR BLD: 0.3 %
KETONES UR STRIP-MCNC: NEGATIVE MG/DL
LEUKOCYTE ESTERASE UR QL STRIP: NEGATIVE
LIPASE SERPL-CCNC: 120 U/L (ref 73–393)
LYMPHOCYTES # BLD AUTO: 1.3 10E9/L (ref 0.8–5.3)
LYMPHOCYTES NFR BLD AUTO: 21.5 %
MCH RBC QN AUTO: 29.3 PG (ref 26.5–33)
MCHC RBC AUTO-ENTMCNC: 34.4 G/DL (ref 31.5–36.5)
MCV RBC AUTO: 85 FL (ref 78–100)
MONOCYTES # BLD AUTO: 0.5 10E9/L (ref 0–1.3)
MONOCYTES NFR BLD AUTO: 7.5 %
MUCOUS THREADS #/AREA URNS LPF: PRESENT /LPF
NEUTROPHILS # BLD AUTO: 4.2 10E9/L (ref 1.6–8.3)
NEUTROPHILS NFR BLD AUTO: 68.5 %
NITRATE UR QL: NEGATIVE
NRBC # BLD AUTO: 0 10*3/UL
NRBC BLD AUTO-RTO: 0 /100
PH UR STRIP: 6.5 PH (ref 5–7)
PLATELET # BLD AUTO: 250 10E9/L (ref 150–450)
POTASSIUM SERPL-SCNC: 3.9 MMOL/L (ref 3.4–5.3)
PROT SERPL-MCNC: 7.9 G/DL (ref 6.8–8.8)
RADIOLOGIST FLAGS: ABNORMAL
RBC # BLD AUTO: 5.42 10E12/L (ref 4.4–5.9)
RBC #/AREA URNS AUTO: 14 /HPF (ref 0–2)
SODIUM SERPL-SCNC: 136 MMOL/L (ref 133–144)
SOURCE: ABNORMAL
SP GR UR STRIP: 1.02 (ref 1–1.03)
SQUAMOUS #/AREA URNS AUTO: 0 /HPF (ref 0–1)
UROBILINOGEN UR STRIP-MCNC: NORMAL MG/DL (ref 0–2)
WBC # BLD AUTO: 6.2 10E9/L (ref 4–11)
WBC #/AREA URNS AUTO: 2 /HPF (ref 0–5)

## 2021-03-12 PROCEDURE — 74177 CT ABD & PELVIS W/CONTRAST: CPT

## 2021-03-12 PROCEDURE — 96361 HYDRATE IV INFUSION ADD-ON: CPT

## 2021-03-12 PROCEDURE — 96374 THER/PROPH/DIAG INJ IV PUSH: CPT | Mod: 59

## 2021-03-12 PROCEDURE — 99285 EMERGENCY DEPT VISIT HI MDM: CPT | Mod: 25

## 2021-03-12 PROCEDURE — 258N000003 HC RX IP 258 OP 636: Performed by: EMERGENCY MEDICINE

## 2021-03-12 PROCEDURE — 96375 TX/PRO/DX INJ NEW DRUG ADDON: CPT

## 2021-03-12 PROCEDURE — 80053 COMPREHEN METABOLIC PANEL: CPT | Performed by: EMERGENCY MEDICINE

## 2021-03-12 PROCEDURE — 250N000011 HC RX IP 250 OP 636: Performed by: EMERGENCY MEDICINE

## 2021-03-12 PROCEDURE — 74177 CT ABD & PELVIS W/CONTRAST: CPT | Mod: 26 | Performed by: RADIOLOGY

## 2021-03-12 PROCEDURE — 83690 ASSAY OF LIPASE: CPT | Performed by: EMERGENCY MEDICINE

## 2021-03-12 PROCEDURE — 81001 URINALYSIS AUTO W/SCOPE: CPT | Performed by: EMERGENCY MEDICINE

## 2021-03-12 PROCEDURE — 85025 COMPLETE CBC W/AUTO DIFF WBC: CPT | Performed by: EMERGENCY MEDICINE

## 2021-03-12 PROCEDURE — 99285 EMERGENCY DEPT VISIT HI MDM: CPT | Performed by: EMERGENCY MEDICINE

## 2021-03-12 RX ORDER — ONDANSETRON 2 MG/ML
4 INJECTION INTRAMUSCULAR; INTRAVENOUS EVERY 30 MIN PRN
Status: DISCONTINUED | OUTPATIENT
Start: 2021-03-12 | End: 2021-03-12 | Stop reason: HOSPADM

## 2021-03-12 RX ORDER — IOPAMIDOL 755 MG/ML
116 INJECTION, SOLUTION INTRAVASCULAR ONCE
Status: COMPLETED | OUTPATIENT
Start: 2021-03-12 | End: 2021-03-12

## 2021-03-12 RX ORDER — ONDANSETRON 4 MG/1
4 TABLET, ORALLY DISINTEGRATING ORAL EVERY 8 HOURS PRN
Qty: 10 TABLET | Refills: 0 | Status: SHIPPED | OUTPATIENT
Start: 2021-03-12 | End: 2021-03-15

## 2021-03-12 RX ORDER — HYDROCODONE BITARTRATE AND ACETAMINOPHEN 5; 325 MG/1; MG/1
1 TABLET ORAL EVERY 6 HOURS PRN
Qty: 10 TABLET | Refills: 0 | Status: SHIPPED | OUTPATIENT
Start: 2021-03-12 | End: 2021-03-15

## 2021-03-12 RX ORDER — HYDROMORPHONE HYDROCHLORIDE 1 MG/ML
0.5 INJECTION, SOLUTION INTRAMUSCULAR; INTRAVENOUS; SUBCUTANEOUS
Status: DISCONTINUED | OUTPATIENT
Start: 2021-03-12 | End: 2021-03-12 | Stop reason: HOSPADM

## 2021-03-12 RX ADMIN — HYDROMORPHONE HYDROCHLORIDE 0.5 MG: 1 INJECTION, SOLUTION INTRAMUSCULAR; INTRAVENOUS; SUBCUTANEOUS at 16:10

## 2021-03-12 RX ADMIN — HYDROMORPHONE HYDROCHLORIDE 0.5 MG: 1 INJECTION, SOLUTION INTRAMUSCULAR; INTRAVENOUS; SUBCUTANEOUS at 18:43

## 2021-03-12 RX ADMIN — IOPAMIDOL 116 ML: 755 INJECTION, SOLUTION INTRAVENOUS at 17:19

## 2021-03-12 RX ADMIN — SODIUM CHLORIDE 1000 ML: 9 INJECTION, SOLUTION INTRAVENOUS at 16:08

## 2021-03-12 RX ADMIN — ONDANSETRON 4 MG: 2 INJECTION INTRAMUSCULAR; INTRAVENOUS at 16:09

## 2021-03-12 ASSESSMENT — ENCOUNTER SYMPTOMS
ABDOMINAL PAIN: 1
COUGH: 0
VOMITING: 0
BLOOD IN STOOL: 0
NAUSEA: 1
FLANK PAIN: 1
ABDOMINAL DISTENTION: 1
DIARRHEA: 0
DYSURIA: 0
APPETITE CHANGE: 0
FEVER: 0

## 2021-03-12 NOTE — ED TRIAGE NOTES
Patient arrives with severe abdominal pain which started around 1:00pm today and has significantly gotten worse. Patient states its mostly in the lower abdomen. Family history of diverticulitis.

## 2021-03-12 NOTE — ED PROVIDER NOTES
"    [place] EMERGENCY DEPARTMENT ([facility])  3/12/21  History     Chief Complaint   Patient presents with     Abdominal Pain     The history is provided by the patient and medical records.     Bernabe Garcia is a 46 year old male with a past medical history significant for alcohol dependence (remission), DAKOTA, and dysthymic disorder who presents to the Emergency Department for evaluation of abdominal pain.  Patient reports that this particular episode of abdominal pain began at approximately 1 PM this afternoon.  He denies history of abdominal pains like this in the past.  He feels as if this abdominal pain \"comes in waves\".  He reports this is a diffuse, lower abdominal pain that radiates to his right side, and into his right back.  When the pain was at its worst, he reports this was a \"sharp\" pain, but now feels as if it is an \"aching\" pain.  Patient reports his last bowel movement was at 1 PM, right after the onset of this pain.  He reports that this was a normal bowel movement, and states that this did not relieve his pain.  He reports he has been attempting to stool since about 1:30 PM, but has not been able to pass any additional bowel movements.  Patient also feels as if there is a \"time bomb\" in his abdomen.  He also reports he has not been able to pass much flatulence since this time as well.  Patient reports that his abdominal pain had significantly worsened since 1:30 PM, prompting his ED visit.  He does report that lying flat does seem to make his abdominal pain slightly better.  Patient also previously endorsed some slight nausea, but denies any episodes of emesis.  Patient denies dysuria, but does endorse increased urinary and stool urgency.  Patient reports his abdomen does feel slightly more distended than normal.  Patient reports he had last eaten last night, and states that he will only usually eat 1 meal per day.  Patient denies cough, fever, or other URI symptoms.    I have reviewed the " Medications, Allergies, Past Medical and Surgical History, and Social History in the [employer] system.  PAST MEDICAL HISTORY: History reviewed. No pertinent past medical history.    PAST SURGICAL HISTORY:   Past Surgical History:   Procedure Laterality Date     COLONOSCOPY N/A 2/3/2016    Procedure: COMBINED COLONOSCOPY, SINGLE OR MULTIPLE BIOPSY/POLYPECTOMY BY BIOPSY;  Surgeon: Charlie Rosa MD;  Location: [facility]     ENT SURGERY      Uvulectomy     TONSILLECTOMY       VASECTOMY         Past medical history, past surgical history, medications, and allergies were reviewed with the patient. Additional pertinent items: None    FAMILY HISTORY:   Family History   Problem Relation Age of Onset     Family History Negative Mother      Family History Negative Father      C.A.D. Maternal Grandfather          in his mid 50's, of an MI     C.A.D. Paternal Grandfather          in his early 50's of an MI       SOCIAL HISTORY:   Social History     Tobacco Use     Smoking status: Never Smoker     Smokeless tobacco: Former User     Tobacco comment: chew   Substance Use Topics     Alcohol use: No     Comment: quit      Social history was reviewed with the patient. Additional pertinent items: None      Discharge Medication List as of 3/12/2021  6:37 PM      START taking these medications    Details   HYDROcodone-acetaminophen (NORCO) 5-325 MG tablet Take 1 tablet by mouth every 6 hours as needed for severe pain, Disp-10 tablet, R-0, Local Print      ondansetron (ZOFRAN ODT) 4 MG ODT tab Take 1 tablet (4 mg) by mouth every 8 hours as needed for nausea, Disp-10 tablet, R-0, E-Prescribe         CONTINUE these medications which have NOT CHANGED    Details   citalopram (CELEXA) 40 MG tablet TAKE 1/2 TO 1 TABLET BY MOUTH EVERY DAY., Disp-90 tablet,R-3, E-Prescribe      nicotine (NICODERM CQ) 14 MG/24HR patch 2h hr Place 1 patch onto the skin every 24 hours, Historical      nicotine (NICORETTE) 2 MG gum Place 2 mg  "inside cheek as needed for smoking cessation, Historical      order for DME CPAP 12 [id number] Device,R-0, Local Print                Allergies   Allergen Reactions     No Known Drug Allergies         Review of Systems   Constitutional: Negative for appetite change and fever.   Respiratory: Negative for cough.    Gastrointestinal: Positive for abdominal distention, abdominal pain and nausea. Negative for blood in stool, diarrhea and vomiting.   Genitourinary: Positive for flank pain (right) and urgency. Negative for dysuria.   All other systems reviewed and are negative.        Physical Exam   BP: (!) 150/89  Pulse: 58  Temp: 98.1  F (36.7  C)  Resp: 16  Height: 177.8 cm (5' 10\")  SpO2: 100 %      Physical Exam  Vitals signs and nursing note reviewed.   Constitutional:       General: He is in acute distress.      Appearance: He is well-developed and normal weight. He is not toxic-appearing or diaphoretic.   HENT:      Head: Normocephalic and atraumatic.      Nose: Nose normal.   Eyes:      General: No scleral icterus.     Conjunctiva/sclera: Conjunctivae normal.   Neck:      Musculoskeletal: Normal range of motion and neck supple. No neck rigidity.   Cardiovascular:      Rate and Rhythm: Normal rate.   Pulmonary:      Effort: Pulmonary effort is normal. No respiratory distress.      Breath sounds: No stridor.   Abdominal:      General: There is no distension.      Palpations: Abdomen is soft.      Tenderness: There is abdominal tenderness in the suprapubic area. There is guarding. There is no rebound. Negative signs include Carrera's sign, Rovsing's sign and McBurney's sign.   Musculoskeletal: Normal range of motion.         General: No deformity or signs of injury.   Skin:     General: Skin is warm and dry.      Coloration: Skin is not jaundiced or pale.      Findings: No rash.   Neurological:      General: No focal deficit present.      Mental Status: He is alert and oriented to person, place, and time. "   Psychiatric:         Attention and Perception: He is inattentive.         Mood and Affect: Mood is anxious.         Behavior: Behavior is slowed and withdrawn. Behavior is cooperative.         Thought Content: Thought content normal.         ED Course   4:09 PM  The patient was seen and examined by Hilda Fournier MD in Room ED21.        Procedures                           Results for orders placed or performed during the hospital encounter of 03/12/21 (from the past 24 hour(s))   CBC with platelets differential   Result Value Ref Range    WBC 6.2 4.0 - 11.0 10e9/L    RBC Count 5.42 4.4 - 5.9 10e12/L    Hemoglobin 15.9 13.3 - 17.7 g/dL    Hematocrit 46.2 40.0 - 53.0 %    MCV 85 78 - 100 fl    MCH 29.3 26.5 - 33.0 pg    MCHC 34.4 31.5 - 36.5 g/dL    RDW 12.5 10.0 - 15.0 %    Platelet Count 250 150 - 450 10e9/L    Diff Method Automated Method     % Neutrophils 68.5 %    % Lymphocytes 21.5 %    % Monocytes 7.5 %    % Eosinophils 1.5 %    % Basophils 0.7 %    % Immature Granulocytes 0.3 %    Nucleated RBCs 0 0 /100    Absolute Neutrophil 4.2 1.6 - 8.3 10e9/L    Absolute Lymphocytes 1.3 0.8 - 5.3 10e9/L    Absolute Monocytes 0.5 0.0 - 1.3 10e9/L    Absolute Eosinophils 0.1 0.0 - 0.7 10e9/L    Absolute Basophils 0.0 0.0 - 0.2 10e9/L    Abs Immature Granulocytes 0.0 0 - 0.4 10e9/L    Absolute Nucleated RBC 0.0    Comprehensive metabolic panel   Result Value Ref Range    Sodium 136 133 - 144 mmol/L    Potassium 3.9 3.4 - 5.3 mmol/L    Chloride 103 94 - 109 mmol/L    Carbon Dioxide 27 20 - 32 mmol/L    Anion Gap 6 3 - 14 mmol/L    Glucose 105 (H) 70 - 99 mg/dL    Urea Nitrogen 13 7 - 30 mg/dL    Creatinine 1.11 0.66 - 1.25 mg/dL    GFR Estimate 78 >60 mL/min/[1.73_m2]    GFR Estimate If Black >90 >60 mL/min/[1.73_m2]    Calcium 9.4 8.5 - 10.1 mg/dL    Bilirubin Total 0.8 0.2 - 1.3 mg/dL    Albumin 4.4 3.4 - 5.0 g/dL    Protein Total 7.9 6.8 - 8.8 g/dL    Alkaline Phosphatase 50 40 - 150 U/L    ALT 24 0 - 70 U/L    AST 20 0 -  45 U/L   Lipase   Result Value Ref Range    Lipase 120 73 - 393 U/L   CT Abdomen Pelvis w Contrast   Result Value Ref Range    Radiologist flags (Urgent)      4 mm stone at the right ureterovesicular junction with    Narrative    EXAMINATION: CT ABDOMEN PELVIS W CONTRAST, 3/12/2021 5:27 PM    TECHNIQUE:  Helical CT images from the lung bases through the  symphysis pubis were obtained with contrast.  Coronal and sagittal  reformatted images were generated at a workstation for further  assessment.    CONTRAST:  116 ml Isovue 370.    COMPARISON: No prior imaging available    HISTORY: Diverticulitis suspected; Bowel obstruction suspected    FINDINGS:    Kidneys ureters and bladder: There is a 4 mm stone at the right  ureterovesicular junction. There is upstream urinary collecting system  dilatation with a moderately dilated ureter and mild pelviectasis.  There is mild right-sided urothelial thickening. No stones or  hydronephrosis on the left. No suspicious renal mass.    Liver: No suspicious liver lesions. Portal veins appear patent.  Gallbladder: No gallstones. No evidence of acute cholecystitis.   Spleen: No suspicious splenic lesions. Normal in size.  Pancreas: No suspicious pancreatic lesions. The pancreatic duct is not  dilated.  Adrenal glands: No adrenal nodules.  Pelvic organs: No suspicious pelvic mass. Prostatic calcifications.  Bowel: Gas and fluid diverticulum in the proximal duodenum. Small  bowel is otherwise normal in caliber. There is mild fecalization,  suggestive delayed transit. No abnormally distended large bowel.  Colonic diverticulosis without evidence of acute diverticulitis. The  appendix is within normal limits.  Lymph nodes: No retroperitoneal, mesenteric, or pelvic  lymphadenopathy.  Peritoneum / Retroperitoneum: No free air or focal fluid accumulations  in the abdomen or pelvis.  Abdominal vasculature: Major vascular structures of the abdomen are  patent normal in caliber.    Bones and soft  tissues: Degenerative changes of the spine. Bilateral  pars defects at L4-5. Grade 1 anterolisthesis and posterior  predominant disc space narrowing at L5-S1. No acute fracture or  suspicious appearing osseous lesion. Small fat-containing umbilical  hernia.     Lower thorax: Mild dependent atelectasis. No focal consolidation. No  suspicious pulmonary nodule within the field-of-view.      Impression    IMPRESSION:   1. There is a 4 mm stone at the right ureterovesicular junction with  evidence of partial obstruction.  2. Bilateral pars defects at L5-S1 with grade 1 anterolisthesis.    [Urgent Result: 4 mm stone at the right ureterovesicular junction with  evidence of partial obstruction.]    Finding was identified on 3/12/2021 5:33 PM.     Hilda Fournier M.D. was contacted by Dr. Luis Hernandez DO (Radiology  R4) at 3/12/2021 5:41 PM and verbalized understanding of the urgent  finding.      I have personally reviewed the examination and initial interpretation  and I agree with the findings.    KEL MAXWELL MD   UA reflex to Microscopic and Culture    Specimen: Urine clean catch; Midstream Urine   Result Value Ref Range    Color Urine Light Yellow     Appearance Urine Clear     Glucose Urine Negative NEG^Negative mg/dL    Bilirubin Urine Negative NEG^Negative    Ketones Urine Negative NEG^Negative mg/dL    Specific Gravity Urine 1.025 1.003 - 1.035    Blood Urine Moderate (A) NEG^Negative    pH Urine 6.5 5.0 - 7.0 pH    Protein Albumin Urine Negative NEG^Negative mg/dL    Urobilinogen mg/dL Normal 0.0 - 2.0 mg/dL    Nitrite Urine Negative NEG^Negative    Leukocyte Esterase Urine Negative NEG^Negative    Source Midstream Urine     RBC Urine 14 (H) 0 - 2 /HPF    WBC Urine 2 0 - 5 /HPF    Squamous Epithelial /HPF Urine 0 0 - 1 /HPF    Mucous Urine Present (A) NEG^Negative /LPF     Medications   0.9% sodium chloride BOLUS (0 mLs Intravenous Stopped 3/12/21 1836)   iopamidol (ISOVUE-370) solution 116 mL (116 mLs Intravenous  Given 3/12/21 1719)   sodium chloride (PF) 0.9% PF flush 79 mL (79 mLs Intravenous Given 3/12/21 1720)             Assessments & Plan (with Medical Decision Making)   Bernabe Garcia is a 46 year old male with a past medical history significant for alcohol dependence (remission), DAKOTA, and dysthymic disorder who presents to the Emergency Department for evaluation of abdominal pain.    Ddx: sbo, diverticulitis, appendicitis, UVJ stone, UTI    Given IVF, zofran, dilaudid. CT abd/plv with con obtained. Stone noted at right UVJ.  Urinalysis without infection.  Normal CBC.  Nl Creatinine and electrolytes.    On reassessment, patient notes resolution of pain after treatment.  Discussed the results of his CT scan.  Will discharge patient with oral pain medications and antiemetics.  Advised ongoing treatment with NSAIDs.  Provided contact information for urology clinic, to follow-up with them as needed.  I also advised patient to continue drinking fluids and to strain his urine.  Patient felt comfortable with home management instructions.  Detailed return precautions provided.      I have reviewed the nursing notes.    I have reviewed the findings, diagnosis, plan and need for follow up with the patient.    Discharge Medication List as of 3/12/2021  6:37 PM      START taking these medications    Details   HYDROcodone-acetaminophen (NORCO) 5-325 MG tablet Take 1 tablet by mouth every 6 hours as needed for severe pain, Disp-10 tablet, R-0, Local Print      ondansetron (ZOFRAN ODT) 4 MG ODT tab Take 1 tablet (4 mg) by mouth every 8 hours as needed for nausea, Disp-10 tablet, R-0, E-Prescribe             Final diagnoses:   Calculus of ureterovesical junction (UVJ)   IMarck, am serving as a trained medical scribe to document services personally performed by Hilda Fournier MD, based on the provider's statements to me.      I, Hilda Fournier MD, was physically present and have reviewed and verified the accuracy of this  note documented by Marck Downs.    3/12/2021   [facility] EMERGENCY DEPARTMENT     Hilda Fournier MD  03/13/21 0224

## 2021-03-13 NOTE — DISCHARGE INSTRUCTIONS
Please make an appointment to follow up with Your Primary Care Provider in 3-7 days even if entirely better. Call Urology Clinic (phone: [phone]) as needed for ongoing pain.    Strain urine and bring stone in for testing. Drink lots of fluids to help flush out the stone.   Take Vicodin for severe pain, as needed.  Do not take this medication with alcohol or other sedating medications.  Do not drive while taking this medication as it can make you sleepy and slower to respond.  Take more of this medication than prescribed.    Take ibuprofen or Tylenol, over-the-counter, as needed for mild to moderate pain.  Take Zofran for nausea, as needed.    Return to the emergency department if you develop worsening pain, nausea or vomiting, inability to keep down your medications, fever, blood in your urine, or decreased urine output.

## 2021-03-20 PROCEDURE — 99000 SPECIMEN HANDLING OFFICE-LAB: CPT | Performed by: FAMILY MEDICINE

## 2021-03-20 PROCEDURE — 82365 CALCULUS SPECTROSCOPY: CPT | Mod: 90 | Performed by: FAMILY MEDICINE

## 2021-04-02 DIAGNOSIS — N20.0 KIDNEY STONE: ICD-10-CM

## 2021-04-06 LAB
APPEARANCE STONE: NORMAL
COMPN STONE: NORMAL
NUMBER STONE: 1
SIZE STONE: NORMAL MM
WT STONE: 20 MG

## 2021-06-14 DIAGNOSIS — F34.1 DYSTHYMIC DISORDER: ICD-10-CM

## 2021-06-15 RX ORDER — CITALOPRAM HYDROBROMIDE 40 MG/1
TABLET ORAL
Qty: 90 TABLET | Refills: 0 | Status: SHIPPED | OUTPATIENT
Start: 2021-06-15 | End: 2021-09-17

## 2021-06-30 ENCOUNTER — OFFICE VISIT (OUTPATIENT)
Dept: ORTHOPEDICS | Facility: CLINIC | Age: 46
End: 2021-06-30
Payer: COMMERCIAL

## 2021-06-30 ENCOUNTER — ANCILLARY PROCEDURE (OUTPATIENT)
Dept: GENERAL RADIOLOGY | Facility: CLINIC | Age: 46
End: 2021-06-30
Attending: PEDIATRICS
Payer: COMMERCIAL

## 2021-06-30 VITALS
SYSTOLIC BLOOD PRESSURE: 120 MMHG | HEIGHT: 70 IN | WEIGHT: 185 LBS | BODY MASS INDEX: 26.48 KG/M2 | DIASTOLIC BLOOD PRESSURE: 75 MMHG

## 2021-06-30 DIAGNOSIS — M54.50 ACUTE BILATERAL LOW BACK PAIN WITHOUT SCIATICA: Primary | ICD-10-CM

## 2021-06-30 DIAGNOSIS — M53.3 SACROILIAC JOINT PAIN: ICD-10-CM

## 2021-06-30 PROCEDURE — 99204 OFFICE O/P NEW MOD 45 MIN: CPT | Performed by: PEDIATRICS

## 2021-06-30 PROCEDURE — 72100 X-RAY EXAM L-S SPINE 2/3 VWS: CPT | Performed by: RADIOLOGY

## 2021-06-30 RX ORDER — METHYLPREDNISOLONE 4 MG
TABLET, DOSE PACK ORAL
Qty: 21 TABLET | Refills: 0 | Status: SHIPPED | OUTPATIENT
Start: 2021-06-30 | End: 2022-01-13

## 2021-06-30 ASSESSMENT — MIFFLIN-ST. JEOR: SCORE: 1715.4

## 2021-06-30 NOTE — LETTER
6/30/2021         RE: Bernabe Garcia  [address] [place] [id number]        Dear Colleague,    Thank you for referring your patient, Bernabe Garcia, to the [facility] [place]. Please see a copy of my visit note below.    ASSESSMENT & PLAN    Bernabe was seen today for pain and lumbar/si.    Diagnoses and all orders for this visit:    Acute bilateral low back pain without sciatica  -     XR Lumbar Spine 2/3 Views  -     [facility] REFERRAL; Future  -     methylPREDNISolone (MEDROL DOSEPAK) 4 MG tablet therapy pack; Follow Package Directions    Sacroiliac joint pain  -     [facility] REFERRAL; Future  -     methylPREDNISolone (MEDROL DOSEPAK) 4 MG tablet therapy pack; Follow Package Directions      This issue is acute and Unchanged.  Discussed likely contributing factors to mechanical back pain including SI joint pain.  Recommended physical therapy.  Would consider further work up pending clinical course.    Plan:  - Today's Plan of Care:  Prescription Medication as directed: Medrol Dose Pack (I reviewed the mechanism of action as well as risk/benefit profile of medications. Questions answered.) Can continue anti-inflammatory medications after prednisone course is over.  Discussed topical treatments.  Rehab: Physical Therapy: [facility] - [phone]    -We also discussed other future treatment options:  MRI Lumbar Spine    Follow Up: 4-6 weeks    Concerning signs and symptoms were reviewed.  The patient expressed understanding of this management plan and all questions were answered at this time.    Jany Wong MD [facility]  Sports Medicine Physician  [facility]      -----  Chief Complaint   Patient presents with     Lower Back - Pain, Lumbar/SI       SUBJECTIVE  Bernabe Garcia is a/an 46 year old male who is seen as a self referral for evaluation of low back pain. It has been bothering him for about 1 week and half to 2 weeks,  "but got really bad yesterday.  Insidious onset, he is doing renovation activities at home, but is unsure if there is any injury. This same injury occurred 15 years ago.  At that time it was diagnosed a \"bulging disc\" which reoccurred a year later, and has not had any issues since.    The patient is seen by themselves.    Onset: 2 week(s) ago. Reports insidious onset without acute precipitating event.  Location of Pain: left sided low back pain  Worsened by: turning in either direction, flexion of the trunk, flexion/abduction of the left leg, seated with knee extension causes pain  Better with: laying down  Treatments tried: rest/activity avoidance, ice, ibuprofen and home exercises  Associated symptoms: weakness of low back , locking or catching and feeling of instability    Orthopedic/Surgical history: YES - Date: 2008 Dx of Lumbago, sprain of LB lumbar   Social History/Occupation: [employer]    No family history pertinent to patient's problem today.    REVIEW OF SYSTEMS:  Review of Systems  Skin: no bruising, no swelling  Musculoskeletal: as above  Neurologic: no numbness, paresthesias  Remainder of review of systems is negative including constitutional, CV, pulmonary, GI, except as noted in HPI or medical history.    OBJECTIVE:  /75   Ht 1.778 m (5' 10\")   Wt 83.9 kg (185 lb)   BMI 26.54 kg/m     General: healthy, alert and in no distress  HEENT: no scleral icterus or conjunctival erythema  Skin: no suspicious lesions or rash. No jaundice.  CV: distal perfusion intact  Resp: normal respiratory effort without conversational dyspnea   Psych: normal mood and affect  Gait: normal steady gait with appropriate coordination and balance  Neuro: Normal light sensory exam of lower extremity    Low back exam:    Inspection:     no visible deformity in the low back       normal skin       normal vascular       normal lymphatic    Posture:      rounded shoulders and upper back    Foot Inspection:     no " deformity noted    Tender:     paraspinal muscles       Left SI joint    Non Tender:     remainder of lumbar spine    ROM:      limited flexion due to pain       limited extension due to pain    Strength:     hip flexion 5/5 bilateral       knee extension 5/5 bilateral       ankle dorsiflexion 5/5 bilateral       ankle plantarflexion 5/5 bilateral       dorsiflexion of the great toe 5/5 bilateral       able to heel and toe walk    Reflexes:     patellar (L3, L4) symmetric normal       achilles tendons (S1) symmetric normal    Sensation:    grossly intact throughout lower extremities    Special tests:      straight leg raise left negative        straight leg raise right negative       positive (+) CHRIS  Bilateral - with pain on the left side       neg (-) FADIR  bilateral    RADIOLOGY:  I independently ordered, visualized and reviewed these images with the patient  3 XR views of lumbar spine reviewed: no acute bony abnormality, no significant degenerative change  - will follow official read      Review of the result(s) of each unique test - XR           Again, thank you for allowing me to participate in the care of your patient.        Sincerely,        Jany Wong MD

## 2021-06-30 NOTE — PATIENT INSTRUCTIONS
Plan:  - Today's Plan of Care:  Prescription Medication as directed: Medrol Dose Pack  Rehab: Physical Therapy: [facility] - [phone]    -We also discussed other future treatment options:  MRI Lumbar Spine    Follow Up: 4-6 weeks    If you have any further questions for your physician or physician s care team you can call [phone] and use option 3 to leave a voice message. Calls received during business hours will be returned same day.

## 2021-06-30 NOTE — PROGRESS NOTES
"ASSESSMENT & PLAN    Bernabe was seen today for pain and lumbar/si.    Diagnoses and all orders for this visit:    Acute bilateral low back pain without sciatica  -     XR Lumbar Spine 2/3 Views  -     [facility] REFERRAL; Future  -     methylPREDNISolone (MEDROL DOSEPAK) 4 MG tablet therapy pack; Follow Package Directions    Sacroiliac joint pain  -     [facility] REFERRAL; Future  -     methylPREDNISolone (MEDROL DOSEPAK) 4 MG tablet therapy pack; Follow Package Directions      This issue is acute and Unchanged.  Discussed likely contributing factors to mechanical back pain including SI joint pain.  Recommended physical therapy.  Would consider further work up pending clinical course.    Plan:  - Today's Plan of Care:  Prescription Medication as directed: Medrol Dose Pack (I reviewed the mechanism of action as well as risk/benefit profile of medications. Questions answered.) Can continue anti-inflammatory medications after prednisone course is over.  Discussed topical treatments.  Rehab: Physical Therapy: [facility] - [phone]    -We also discussed other future treatment options:  MRI Lumbar Spine    Follow Up: 4-6 weeks    Concerning signs and symptoms were reviewed.  The patient expressed understanding of this management plan and all questions were answered at this time.    Jany Wong MD [facility]  Sports Medicine Physician  [facility]      -----  Chief Complaint   Patient presents with     Lower Back - Pain, Lumbar/SI       SUBJECTIVE  Bernabe Garcia is a/an 46 year old male who is seen as a self referral for evaluation of low back pain. It has been bothering him for about 1 week and half to 2 weeks, but got really bad yesterday.  Insidious onset, he is doing renovation activities at home, but is unsure if there is any injury. This same injury occurred 15 years ago.  At that time it was diagnosed a \"bulging disc\" which reoccurred a year later, and has not had " "any issues since.    The patient is seen by themselves.    Onset: 2 week(s) ago. Reports insidious onset without acute precipitating event.  Location of Pain: left sided low back pain  Worsened by: turning in either direction, flexion of the trunk, flexion/abduction of the left leg, seated with knee extension causes pain  Better with: laying down  Treatments tried: rest/activity avoidance, ice, ibuprofen and home exercises  Associated symptoms: weakness of low back , locking or catching and feeling of instability    Orthopedic/Surgical history: YES - Date: 2008 Dx of Lumbago, sprain of LB lumbar   Social History/Occupation: [employer]    No family history pertinent to patient's problem today.    REVIEW OF SYSTEMS:  Review of Systems  Skin: no bruising, no swelling  Musculoskeletal: as above  Neurologic: no numbness, paresthesias  Remainder of review of systems is negative including constitutional, CV, pulmonary, GI, except as noted in HPI or medical history.    OBJECTIVE:  /75   Ht 1.778 m (5' 10\")   Wt 83.9 kg (185 lb)   BMI 26.54 kg/m     General: healthy, alert and in no distress  HEENT: no scleral icterus or conjunctival erythema  Skin: no suspicious lesions or rash. No jaundice.  CV: distal perfusion intact  Resp: normal respiratory effort without conversational dyspnea   Psych: normal mood and affect  Gait: normal steady gait with appropriate coordination and balance  Neuro: Normal light sensory exam of lower extremity    Low back exam:    Inspection:     no visible deformity in the low back       normal skin       normal vascular       normal lymphatic    Posture:      rounded shoulders and upper back    Foot Inspection:     no deformity noted    Tender:     paraspinal muscles       Left SI joint    Non Tender:     remainder of lumbar spine    ROM:      limited flexion due to pain       limited extension due to pain    Strength:     hip flexion 5/5 bilateral       knee extension 5/5 " bilateral       ankle dorsiflexion 5/5 bilateral       ankle plantarflexion 5/5 bilateral       dorsiflexion of the great toe 5/5 bilateral       able to heel and toe walk    Reflexes:     patellar (L3, L4) symmetric normal       achilles tendons (S1) symmetric normal    Sensation:    grossly intact throughout lower extremities    Special tests:      straight leg raise left negative        straight leg raise right negative       positive (+) CHRIS  Bilateral - with pain on the left side       neg (-) FADIR  bilateral    RADIOLOGY:  I independently ordered, visualized and reviewed these images with the patient  3 XR views of lumbar spine reviewed: no acute bony abnormality, no significant degenerative change  - will follow official read      Review of the result(s) of each unique test - XR

## 2021-07-01 ENCOUNTER — THERAPY VISIT (OUTPATIENT)
Dept: PHYSICAL THERAPY | Facility: CLINIC | Age: 46
End: 2021-07-01
Attending: PEDIATRICS
Payer: COMMERCIAL

## 2021-07-01 DIAGNOSIS — M53.3 SACROILIAC JOINT PAIN: ICD-10-CM

## 2021-07-01 DIAGNOSIS — M54.50 ACUTE LEFT-SIDED LOW BACK PAIN WITHOUT SCIATICA: ICD-10-CM

## 2021-07-01 DIAGNOSIS — M54.50 ACUTE BILATERAL LOW BACK PAIN WITHOUT SCIATICA: ICD-10-CM

## 2021-07-01 PROCEDURE — 97162 PT EVAL MOD COMPLEX 30 MIN: CPT | Mod: GP | Performed by: PHYSICAL THERAPIST

## 2021-07-01 PROCEDURE — 97110 THERAPEUTIC EXERCISES: CPT | Mod: GP | Performed by: PHYSICAL THERAPIST

## 2021-07-01 NOTE — RESULT ENCOUNTER NOTE
These results were discussed during office visit.    Jany Wong MD, CAQ  Primary Care Sports Medicine  [facility]

## 2021-07-01 NOTE — PROGRESS NOTES
"Physical Therapy Initial Evaluation  Subjective:  The history is provided by the patient. No  was used.   Therapist Generated HPI Evaluation  Problem details: Pt notes about two weeks of back pain without specific incident.  May have been having some discomfort prior to some water tubing.  Got \"really bad\" earlier this week and referred to PT 06/30/2021.  Feels like there is a little relief since starting Prednisone.         Type of problem:  Lumbar.    This is a new condition.  Condition occurred with:  Other reason.  Where condition occurred: for unknown reasons.  Patient reports pain:  Lumbar spine left.  Pain is described as sharp and aching and is intermittent.  Pain radiates to:  No radiation. Pain is worse in the P.M..  Since onset symptoms are gradually improving.  Exacerbated by: bending, twisting, sitting.  Relieved by: lying down.  Imaging testing: xray in chart.  Past treatment: N/A.   Restrictions due to condition include:  Working in normal job without restrictions.  Barriers include:  None as reported by patient.    Patient Health History  Bernabe Garcia being seen for lower back pain.       Problem occurred: unknown   Pain is reported as 5/10 on pain scale.  General health as reported by patient is good.  Pertinent medical history includes: overweight and chemical dependency.   Red flags:  None as reported by patient.  Medical allergies: none.   Surgeries include:  None.    Current medications:  Anti-inflammatory, anti-depressants and steroids.    Current occupation is .   Primary job tasks include:  Computer work and prolonged sitting.                Pt reports his goal is to \"have it gone.\"  Is headed to the [facility] on 07/04.                    Objective:  System         Lumbar/SI Evaluation  ROM:      Strength: TA MMT 1/5  Lumbar Myotomes:    T12-L3 (Hip Flex):  Left: 5    Right: 5  L2-4 (Quads):  Left:  5    Right:  5  L4 (Ankle DF):  Left:  5    Right:  " 5          Lumbar Dermtomes:  normal                Neural Tension/Mobility:      Left side:SLR  negative.     Right side:   SLR  negative.   Lumbar Palpation:  normal        Lumbar Provocation:      Left negative with:  PROM hip    Right negative with:  PROM hip    SI joint/Sacrum:    Negative Arnold thigh thrustTRISTON Lumbar Evaluation    Posture:  Sitting: fair  Standing: fair  Lordosis: Reduced  Lateral Shift: no  Correction of Posture: better    Movement Loss:  Flexion (Flex): major and pain  Extension (EXT): major  Side Glide R (SG R): mod and pain  Side Glide L (SG L): mod and pain  Test Movements:  FIS: During: increases  After: worse  Pretest Movements: L low back  Repeat FIS: During: increases  After: no worse    EIS: During: decreases  After: no better    Repeat EIS: During: decreases  After: better      EIL: During: increases  After: no worse    Repeat EIL: During: decreases  After: better  Mechanical Response: IncROM        Conclusion: derangement  Principle of Treatment:      Extension: REIL                                           ROS    Assessment/Plan:    Patient is a 46 year old male with lumbar complaints.    Patient has the following significant findings with corresponding treatment plan.                Diagnosis 1:  LBP   Pain -  hot/cold therapy and directional preference exercise  Decreased ROM/flexibility - manual therapy and therapeutic exercise  Decreased strength - therapeutic exercise and therapeutic activities  Decreased function - therapeutic activities  Impaired posture - neuro re-education    Therapy Evaluation Codes:   1) History comprised of:   Personal factors that impact the plan of care:      Time since onset of symptoms.    Comorbidity factors that impact the plan of care are:      None.     Medications impacting care: Anti-depressant, Anti-inflammatory and Steroids.  2) Examination of Body Systems comprised  of:   Body structures and functions that impact the plan of care:      Lumbar spine.   Activity limitations that impact the plan of care are:      Bending and Sitting.  3) Clinical presentation characteristics are:   Evolving/Changing.  4) Decision-Making    Moderate complexity using standardized patient assessment instrument and/or measureable assessment of functional outcome.  Cumulative Therapy Evaluation is: Moderate complexity.    Previous and current functional limitations:  (See Goal Flow Sheet for this information)    Short term and Long term goals: (See Goal Flow Sheet for this information)     Communication ability:  Patient appears to be able to clearly communicate and understand verbal and written communication and follow directions correctly.  Treatment Explanation - The following has been discussed with the patient:   RX ordered/plan of care  Anticipated outcomes  Possible risks and side effects  This patient would benefit from PT intervention to resume normal activities.   Rehab potential is good.    Frequency:  1 X week, once daily  Duration:  for 6 weeks  Consider increase frequency if pt not doing well upon return from [facility].  Discharge Plan:  Achieve all LTG.  Independent in home treatment program.  Reach maximal therapeutic benefit.    Please refer to the daily flowsheet for treatment today, total treatment time and time spent performing 1:1 timed codes.

## 2021-07-12 ENCOUNTER — THERAPY VISIT (OUTPATIENT)
Dept: PHYSICAL THERAPY | Facility: CLINIC | Age: 46
End: 2021-07-12
Payer: COMMERCIAL

## 2021-07-12 DIAGNOSIS — M54.50 ACUTE LEFT-SIDED LOW BACK PAIN WITHOUT SCIATICA: ICD-10-CM

## 2021-07-12 PROCEDURE — 97110 THERAPEUTIC EXERCISES: CPT | Mod: GP

## 2021-07-13 ENCOUNTER — MYC MEDICAL ADVICE (OUTPATIENT)
Dept: SLEEP MEDICINE | Facility: CLINIC | Age: 46
End: 2021-07-13

## 2021-07-26 ENCOUNTER — THERAPY VISIT (OUTPATIENT)
Dept: PHYSICAL THERAPY | Facility: CLINIC | Age: 46
End: 2021-07-26
Payer: COMMERCIAL

## 2021-07-26 DIAGNOSIS — M54.50 ACUTE LEFT-SIDED LOW BACK PAIN WITHOUT SCIATICA: ICD-10-CM

## 2021-07-26 PROCEDURE — 97110 THERAPEUTIC EXERCISES: CPT | Mod: GP

## 2021-08-26 PROBLEM — M54.50 ACUTE LEFT-SIDED LOW BACK PAIN WITHOUT SCIATICA: Status: RESOLVED | Noted: 2021-07-01 | Resolved: 2021-08-26

## 2021-08-26 NOTE — PROGRESS NOTES
Subjective:  HPI  Physical Exam                    Objective:  System    Physical Exam    General     ROS    Assessment/Plan:    DISCHARGE REPORT    Progress reporting period is from 07/01/2021 to 08/26/2021.   Patient has not returned to therapy so current subjective and objective findings are unknown.  The subjective and objective information are from the last visit (07/26/2021) with this patient.    SUBJECTIVE  Subjective: pt reports that sitting is no longer an issue, but hasn't been testing limits   (pt has been working on posture )   Current Pain level: 0/10   Initial Pain level: 5/10   Changes in function: Yes, see goal flow sheet for change in function     OBJECTIVE  Objective: almost touches fingers to toes, lock and sag elbow ext. pt did have some disomfort at right SI jt and upper buttock .       ASSESSMENT/PLAN  Updated problem list and treatment plan: Diagnosis 1:  LBP -- home program  STG/LTGs have been met or progress has been made towards goals:  N/A  Assessment of Progress: The patient has not returned to therapy. Current status is unknown.  Self Management Plans:  Patient has been instructed in a home treatment program.  Bernabe continues to require the following intervention to meet STG and LTG's: PT intervention is no longer required to meet STG/LTG.    Recommendations:  Pt last seen in PT 07/26/2021.  He noted at that date that he would reach out to us if not progressing.  Have not heard from him since and no appts are scheduled.  Discharge to [facility].

## 2021-09-15 DIAGNOSIS — F34.1 DYSTHYMIC DISORDER: ICD-10-CM

## 2021-09-17 RX ORDER — CITALOPRAM HYDROBROMIDE 40 MG/1
TABLET ORAL
Qty: 30 TABLET | Refills: 0 | Status: SHIPPED | OUTPATIENT
Start: 2021-09-17 | End: 2021-10-22

## 2021-09-17 NOTE — TELEPHONE ENCOUNTER
Routing refill request to provider for review/approval because:  Labs not current:  phq9  Patient needs to be seen because it has been more than 1 year since last office visit.      Medication pended for approval, 30 day supply with reminder.   2nd reminder

## 2021-09-19 ENCOUNTER — HEALTH MAINTENANCE LETTER (OUTPATIENT)
Age: 46
End: 2021-09-19

## 2021-10-20 DIAGNOSIS — F34.1 DYSTHYMIC DISORDER: ICD-10-CM

## 2021-10-22 NOTE — TELEPHONE ENCOUNTER
Patient has been given ansley refill and still no follow up.  Patient last visit virtual 6-22-20.  Deny??

## 2021-10-25 RX ORDER — CITALOPRAM HYDROBROMIDE 40 MG/1
TABLET ORAL
Qty: 30 TABLET | Refills: 0 | Status: SHIPPED | OUTPATIENT
Start: 2021-10-25 | End: 2021-11-30

## 2021-12-14 ENCOUNTER — E-VISIT (OUTPATIENT)
Dept: FAMILY MEDICINE | Facility: CLINIC | Age: 46
End: 2021-12-14
Payer: COMMERCIAL

## 2021-12-14 DIAGNOSIS — Z53.9 ERRONEOUS ENCOUNTER--DISREGARD: Primary | ICD-10-CM

## 2022-01-09 ENCOUNTER — HEALTH MAINTENANCE LETTER (OUTPATIENT)
Age: 47
End: 2022-01-09

## 2022-01-11 ASSESSMENT — ENCOUNTER SYMPTOMS
PARESTHESIAS: 0
CHILLS: 0
SORE THROAT: 0
ABDOMINAL PAIN: 0
JOINT SWELLING: 0
PALPITATIONS: 0
DIARRHEA: 0
ARTHRALGIAS: 0
CONSTIPATION: 0
DYSURIA: 0
HEMATURIA: 0
EYE PAIN: 0
WEAKNESS: 0
HEMATOCHEZIA: 0
NERVOUS/ANXIOUS: 0
NAUSEA: 0
MYALGIAS: 0
HEADACHES: 0
DIZZINESS: 0
FEVER: 0
FREQUENCY: 0
COUGH: 0
HEARTBURN: 0
SHORTNESS OF BREATH: 0

## 2022-01-13 ENCOUNTER — OFFICE VISIT (OUTPATIENT)
Dept: FAMILY MEDICINE | Facility: CLINIC | Age: 47
End: 2022-01-13
Payer: COMMERCIAL

## 2022-01-13 VITALS
BODY MASS INDEX: 27.2 KG/M2 | SYSTOLIC BLOOD PRESSURE: 112 MMHG | HEIGHT: 70 IN | OXYGEN SATURATION: 98 % | TEMPERATURE: 98.2 F | WEIGHT: 190 LBS | RESPIRATION RATE: 15 BRPM | HEART RATE: 76 BPM | DIASTOLIC BLOOD PRESSURE: 74 MMHG

## 2022-01-13 DIAGNOSIS — F34.1 DYSTHYMIC DISORDER: ICD-10-CM

## 2022-01-13 DIAGNOSIS — G47.33 OSA (OBSTRUCTIVE SLEEP APNEA): Chronic | ICD-10-CM

## 2022-01-13 DIAGNOSIS — M54.50 CHRONIC MIDLINE LOW BACK PAIN WITHOUT SCIATICA: ICD-10-CM

## 2022-01-13 DIAGNOSIS — F10.21 ALCOHOL DEPENDENCE IN REMISSION (H): ICD-10-CM

## 2022-01-13 DIAGNOSIS — Z00.00 ROUTINE GENERAL MEDICAL EXAMINATION AT A HEALTH CARE FACILITY: Primary | ICD-10-CM

## 2022-01-13 DIAGNOSIS — M51.26 LUMBAR DISC HERNIATION: ICD-10-CM

## 2022-01-13 DIAGNOSIS — F17.290 OTHER TOBACCO PRODUCT NICOTINE DEPENDENCE, UNCOMPLICATED: ICD-10-CM

## 2022-01-13 DIAGNOSIS — G89.29 CHRONIC MIDLINE LOW BACK PAIN WITHOUT SCIATICA: ICD-10-CM

## 2022-01-13 DIAGNOSIS — Z23 NEED FOR VACCINATION: ICD-10-CM

## 2022-01-13 PROCEDURE — 99214 OFFICE O/P EST MOD 30 MIN: CPT | Mod: 25 | Performed by: FAMILY MEDICINE

## 2022-01-13 PROCEDURE — 99396 PREV VISIT EST AGE 40-64: CPT | Performed by: FAMILY MEDICINE

## 2022-01-13 PROCEDURE — 90686 IIV4 VACC NO PRSV 0.5 ML IM: CPT | Performed by: FAMILY MEDICINE

## 2022-01-13 PROCEDURE — 90472 IMMUNIZATION ADMIN EACH ADD: CPT | Performed by: FAMILY MEDICINE

## 2022-01-13 PROCEDURE — 90471 IMMUNIZATION ADMIN: CPT | Performed by: FAMILY MEDICINE

## 2022-01-13 PROCEDURE — 90715 TDAP VACCINE 7 YRS/> IM: CPT | Performed by: FAMILY MEDICINE

## 2022-01-13 RX ORDER — CITALOPRAM HYDROBROMIDE 40 MG/1
TABLET ORAL
Qty: 90 TABLET | Refills: 3 | Status: SHIPPED | OUTPATIENT
Start: 2022-01-13 | End: 2022-05-06

## 2022-01-13 ASSESSMENT — ENCOUNTER SYMPTOMS
NAUSEA: 0
DYSURIA: 0
NERVOUS/ANXIOUS: 0
COUGH: 0
HEADACHES: 0
PALPITATIONS: 0
SORE THROAT: 0
FREQUENCY: 0
PARESTHESIAS: 0
CHILLS: 0
JOINT SWELLING: 0
ARTHRALGIAS: 0
HEMATOCHEZIA: 0
SHORTNESS OF BREATH: 0
FEVER: 0
DIARRHEA: 0
DIZZINESS: 0
MYALGIAS: 0
HEARTBURN: 0
HEMATURIA: 0
EYE PAIN: 0
ABDOMINAL PAIN: 0
CONSTIPATION: 0
WEAKNESS: 0

## 2022-01-13 ASSESSMENT — MIFFLIN-ST. JEOR: SCORE: 1738.08

## 2022-01-13 ASSESSMENT — PAIN SCALES - GENERAL: PAINLEVEL: MILD PAIN (2)

## 2022-01-13 ASSESSMENT — PATIENT HEALTH QUESTIONNAIRE - PHQ9: SUM OF ALL RESPONSES TO PHQ QUESTIONS 1-9: 0

## 2022-01-13 NOTE — PROGRESS NOTES
"  {PROVIDER CHARTING PREFERENCE:752687}    Kali Kidd is a 46 year old who presents for the following health issues {ACCOMPANIED BY STATEMENT (Optional):231035}    Healthy Habits:     Getting at least 3 servings of Calcium per day:  NO    Bi-annual eye exam:  Yes    Dental care twice a year:  Yes    Sleep apnea or symptoms of sleep apnea:  Sleep apnea    Diet:  Carbohydrate counting and Breakfast skipped    Frequency of exercise:  None    Taking medications regularly:  Yes    Medication side effects:  None    PHQ-2 Total Score: 0    Additional concerns today:  Yes     Back Pain       Duration: ***        Specific cause: {.:186562::\"none\"}    Description:   Location of pain: {.:456376}  Character of pain: {.:450711}  Pain radiation:{.:801697::\"none\"}  New numbness or weakness in legs, not attributed to pain:  { :627064}    Intensity: {.:140436::\"Currently ***/10\"}    History:   Pain interferes with job: {.:620537::\"YES\",\"No\"}  History of back problems: {.:715138::\"no prior back problems\"}  Any previous MRI or X-rays: {.:692992::\"None\"}  Sees a specialist for back pain:  { :550689::\"No\"}  Therapies tried without relief: {.:022095}    Alleviating factors:   Improved by: {.:591783}      Precipitating factors:  Worsened by: {.:004091::\"Nothing\"}    {Rooming staff to stop here}      Accompanying Signs & Symptoms:  Risk of Fracture:  {.:332207::\"None\"}  Risk of Cauda Equina:  {.:817328::\"None\"}  Risk of Infection:  {.:045813::\"None\"}  Risk of Cancer:  {.:204002::\"None\"}  Risk of Ankylosing Spondylitis:  Onset at age <35, male, AND morning back stiffness. { :756570}    {If yes to any of the last 5 items or sudden/progressive weakness, consider imaging and/or surgical referral, if not already done}    {When medically safe, First line: medication for acute LBP:  Acetaminophen  Second line: NSAIDS, muscle relaxants, and consider gabapentin for radiculopathy; opioids are usually not needed. Can add <dot> pilbp in " patient instructions}          {additonal problems for provider to add (Optional):646503}    Review of Systems   Constitutional: Negative for chills and fever.   HENT: Negative for congestion, ear pain, hearing loss and sore throat.    Eyes: Negative for pain and visual disturbance.   Respiratory: Negative for cough and shortness of breath.    Cardiovascular: Negative for chest pain, palpitations and peripheral edema.   Gastrointestinal: Negative for abdominal pain, constipation, diarrhea, heartburn, hematochezia and nausea.   Genitourinary: Negative for dysuria, frequency, genital sores, hematuria, impotence, penile discharge and urgency.   Musculoskeletal: Negative for arthralgias, joint swelling and myalgias.   Skin: Negative for rash.   Neurological: Negative for dizziness, weakness, headaches and paresthesias.   Psychiatric/Behavioral: Negative for mood changes. The patient is not nervous/anxious.       {ROS COMP (Optional):357579}      Objective    There were no vitals taken for this visit.  There is no height or weight on file to calculate BMI.  Physical Exam   {Exam List (Optional):526532}    {Diagnostic Test Results (Optional):429928}    {AMBULATORY ATTESTATION (Optional):314280}

## 2022-01-13 NOTE — PROGRESS NOTES
SUBJECTIVE:   CC: Bernabe Garcia is an 46 year old male who presents for preventative health visit.     Patient has been advised of split billing requirements and indicates understanding: Yes  Healthy Habits:     Getting at least 3 servings of Calcium per day:  NO    Bi-annual eye exam:  Yes    Dental care twice a year:  Yes    Sleep apnea or symptoms of sleep apnea:  Sleep apnea    Diet:  Carbohydrate counting and Breakfast skipped    Frequency of exercise:  None    Taking medications regularly:  Yes    Medication side effects:  None    PHQ-2 Total Score: 0    Additional concerns today:  Yes      Chronic/Recurring Back Pain Follow Up      Where is your back pain located? (Select all that apply) low back bilateral    How would you describe your back pain?  dull ache    Where does your back pain spread? the right and left buttock    Since your last clinic visit for back pain, how has your pain changed? gradually worsening    Does your back pain interfere with your job? YES    Since your last visit, have you tried any new treatment? No; saw PT earlier this years      Today's PHQ-2 Score:   PHQ-2 ( 1999 Pf[employer]) 1/11/2022   Q1: Little interest or pleasure in doing things 0   Q2: Feeling down, depressed or hopeless 0   PHQ-2 Score 0   Q1: Little interest or pleasure in doing things Not at all   Q2: Feeling down, depressed or hopeless Not at all   PHQ-2 Score 0     Abuse: Current or Past(Physical, Sexual or Emotional)- No  Do you feel safe in your environment? Yes    Have you ever done Advance Care Planning? (For example, a Health Directive, POLST, or a discussion with a medical provider or your loved ones about your wishes): No, advance care planning information given to patient to review.  Patient declined advance care planning discussion at this time.    Social History     Tobacco Use     Smoking status: Never Smoker     Smokeless tobacco: Former User     Tobacco comment: chew   Substance Use Topics     Alcohol use: No  "    Comment: quit 4/09     If you drink alcohol do you typically have >3 drinks per day or >7 drinks per week? No    Alcohol Use 1/13/2022   Prescreen: >3 drinks/day or >7 drinks/week? -   Prescreen: >3 drinks/day or >7 drinks/week? No     Last PSA: No results found for: PSA    Reviewed orders with patient. Reviewed health maintenance and updated orders accordingly - Yes  BP Readings from Last 3 Encounters:   01/13/22 112/74   06/30/21 120/75   03/12/21 126/81    Wt Readings from Last 3 Encounters:   01/13/22 86.2 kg (190 lb)   06/30/21 83.9 kg (185 lb)   08/14/20 86.2 kg (190 lb)         Reviewed and updated as needed this visit by clinical staff  Tobacco  Allergies  Meds         Clau Bustamante CMA    Reviewed and updated as needed this visit by Provider                   Review of Systems   Constitutional: Negative for chills and fever.   HENT: Negative for congestion, ear pain, hearing loss and sore throat.    Eyes: Negative for pain and visual disturbance.   Respiratory: Negative for cough and shortness of breath.    Cardiovascular: Negative for chest pain, palpitations and peripheral edema.   Gastrointestinal: Negative for abdominal pain, constipation, diarrhea, heartburn, hematochezia and nausea.   Genitourinary: Negative for dysuria, frequency, genital sores, hematuria, impotence, penile discharge and urgency.   Musculoskeletal: Negative for arthralgias, joint swelling and myalgias.   Skin: Negative for rash.   Neurological: Negative for dizziness, weakness, headaches and paresthesias.   Psychiatric/Behavioral: Negative for mood changes. The patient is not nervous/anxious.          OBJECTIVE:   /74   Pulse 76   Temp 98.2  F (36.8  C) (Tympanic)   Resp 15   Ht 1.778 m (5' 10\")   Wt 86.2 kg (190 lb)   SpO2 98%   BMI 27.26 kg/m      Physical Exam  GENERAL: Healthy, alert and no distress  EYES: Eyes grossly normal to inspection, conjunctivae and sclerae normal  RESP: Lungs clear to auscultation - no " rales, rhonchi or wheezes  CV: Regular rate and rhythm, normal S1 S2, no murmur  MS: No gross musculoskeletal defects noted, no edema  NEURO: Normal strength and tone, mentation intact and speech normal  PSYCH: Mentation appears normal, affect normal/bright      Diagnostic Test Results:  Labs reviewed in Epic  No results found for any visits on 01/13/22.    ASSESSMENT/PLAN:   (Z00.00) Routine general medical examination at a health care facility  (primary encounter diagnosis)  Comment: Reviewed need for preventative healthcare exams and screenings.  Plan: REVIEW OF HEALTH MAINTENANCE PROTOCOL ORDERS        Advise yearly physical.    (F34.1) Dysthymic disorder  Comment: Doing well on SSRI therapy.  Plan: citalopram (CELEXA) 40 MG tablet        Continue.  1 year refill.    (F10.21) Alcohol dependence in remission (H)  Comment: Maintaining sobriety, but no alcohol use for years.  Plan: Monitor.    (M54.50,  G89.29) Chronic midline low back pain without sciatica  Comment: Suspicious for L5 disc etiology.  Plan: MR Lumbar Spine w/o Contrast        Will refer for MRI.  Await test results.    (Z23) Need for vaccination  Plan: INFLUENZA VACCINE IM > 6 MONTHS VALENT IIV4         (AFLURIA/FLUZONE), TDAP VACCINE (Adacel,         Boostrix)  [[id number]]      (G47.33) DAKOTA (obstructive sleep apnea)-  severe (AHI 36)  Comment: Advise consistent use of his CPAP.      (F17.290) Other tobacco product nicotine dependence, uncomplicated  Comment: Currently using chewing tobacco.  Plan: Reviewed nicotine cessation.    Patient Instructions       Continue on the Celexa. One year refill sent.     Flu shot    Tetanus booster.     We'll set up a MRI for your lower back. This will determine the next step.     At some time, quit the nicotine.       Patient has been advised of split billing requirements and indicates understanding: Yes  COUNSELING:   Reviewed preventive health counseling, as reflected in patient instructions       Regular  "exercise       Healthy diet/nutrition       Vision screening       Immunizations    Vaccinated for: Influenza          Estimated body mass index is 27.26 kg/m  as calculated from the following:    Height as of this encounter: 1.778 m (5' 10\").    Weight as of this encounter: 86.2 kg (190 lb).     Weight management plan: Discussed healthy diet and exercise guidelines    He reports that he has never smoked. He has quit using smokeless tobacco.      Counseling Resources:  ATP IV Guidelines  Pooled Cohorts Equation Calculator  FRAX Risk Assessment  ICSI Preventive Guidelines  Dietary Guidelines for Americans, 2010  USDA's MyPlate  ASA Prophylaxis  Lung CA Screening    Aislinn Mccollum MD  [facility]  "

## 2022-01-13 NOTE — PATIENT INSTRUCTIONS
Preventive Health Recommendations  Male Ages 40 to 49    Continue on the Celexa. One year refill sent.     Flu shot    Tetanus booster.     We'll set up a MRI for your lower back. This will determine the next step.     At some time, quit the nicotine.     Yearly exam:             See your health care provider every year in order to  o   Review health changes.   o   Discuss preventive care.    o   Review your medicines if your doctor has prescribed any.    You should be tested each year for STDs (sexually transmitted diseases) if you re at risk.     Have a cholesterol test every 5 years.     Have a colonoscopy (test for colon cancer) if someone in your family has had colon cancer or polyps before age 50.     After age 45, have a diabetes test (fasting glucose). If you are at risk for diabetes, you should have this test every 3 years.      Talk with your health care provider about whether or not a prostate cancer screening test (PSA) is right for you.    Shots: Get a flu shot each year. Get a tetanus shot every 10 years.     Nutrition:    Eat at least 5 servings of fruits and vegetables daily.     Eat whole-grain bread, whole-wheat pasta and brown rice instead of white grains and rice.     Get adequate Calcium and Vitamin D.     Lifestyle    Exercise for at least 150 minutes a week (30 minutes a day, 5 days a week). This will help you control your weight and prevent disease.     Limit alcohol to one drink per day.     No smoking.     Wear sunscreen to prevent skin cancer.     See your dentist every six months for an exam and cleaning.

## 2022-01-18 ENCOUNTER — ANCILLARY PROCEDURE (OUTPATIENT)
Dept: MRI IMAGING | Facility: CLINIC | Age: 47
End: 2022-01-18
Attending: FAMILY MEDICINE
Payer: COMMERCIAL

## 2022-01-18 DIAGNOSIS — G89.29 CHRONIC MIDLINE LOW BACK PAIN WITHOUT SCIATICA: ICD-10-CM

## 2022-01-18 DIAGNOSIS — M54.50 CHRONIC MIDLINE LOW BACK PAIN WITHOUT SCIATICA: ICD-10-CM

## 2022-01-18 PROCEDURE — 72148 MRI LUMBAR SPINE W/O DYE: CPT | Mod: TC | Performed by: RADIOLOGY

## 2022-01-23 ASSESSMENT — ENCOUNTER SYMPTOMS
DIARRHEA: 1
STIFFNESS: 0
NAUSEA: 0
MUSCLE WEAKNESS: 1
JOINT SWELLING: 0
BACK PAIN: 1
RECTAL PAIN: 1
NECK PAIN: 0
VOMITING: 0
MUSCLE CRAMPS: 1
BLOATING: 0
BLOOD IN STOOL: 1
ARTHRALGIAS: 0
HEARTBURN: 0
ABDOMINAL PAIN: 0
CONSTIPATION: 1
JAUNDICE: 0
BOWEL INCONTINENCE: 0
MYALGIAS: 1

## 2022-01-24 ENCOUNTER — ANCILLARY PROCEDURE (OUTPATIENT)
Dept: GENERAL RADIOLOGY | Facility: CLINIC | Age: 47
End: 2022-01-24
Attending: ORTHOPAEDIC SURGERY
Payer: COMMERCIAL

## 2022-01-24 ENCOUNTER — OFFICE VISIT (OUTPATIENT)
Dept: ORTHOPEDICS | Facility: CLINIC | Age: 47
End: 2022-01-24
Attending: FAMILY MEDICINE
Payer: COMMERCIAL

## 2022-01-24 VITALS — WEIGHT: 188 LBS | HEIGHT: 70 IN | BODY MASS INDEX: 26.92 KG/M2

## 2022-01-24 DIAGNOSIS — M54.16 LUMBAR RADICULOPATHY: ICD-10-CM

## 2022-01-24 DIAGNOSIS — M43.06 LUMBAR PARS DEFECT: Primary | ICD-10-CM

## 2022-01-24 DIAGNOSIS — M54.50 LOW BACK PAIN: Primary | ICD-10-CM

## 2022-01-24 DIAGNOSIS — M51.26 LUMBAR DISC HERNIATION: ICD-10-CM

## 2022-01-24 DIAGNOSIS — M48.07 FORAMINAL STENOSIS OF LUMBOSACRAL REGION: ICD-10-CM

## 2022-01-24 DIAGNOSIS — M43.10 ISTHMIC SPONDYLOLISTHESIS: ICD-10-CM

## 2022-01-24 DIAGNOSIS — M48.061 SPINAL STENOSIS OF LUMBAR REGION WITHOUT NEUROGENIC CLAUDICATION: ICD-10-CM

## 2022-01-24 PROCEDURE — 72110 X-RAY EXAM L-2 SPINE 4/>VWS: CPT | Performed by: RADIOLOGY

## 2022-01-24 PROCEDURE — 99205 OFFICE O/P NEW HI 60 MIN: CPT | Performed by: ORTHOPAEDIC SURGERY

## 2022-01-24 ASSESSMENT — MIFFLIN-ST. JEOR: SCORE: 1729.01

## 2022-01-24 NOTE — NURSING NOTE
"Reason For Visit:   Chief Complaint   Patient presents with     Consult     Chronic midline low back pain without sciatica, Lumbar disc herniation/MRI/Aislinn Mccollum [facility] in [facility]        Primary MD: Aislinn Mccollum  Ref. MD:     ?  No  Occupation Computer programer.  Currently working? Yes.  Work status?  Full time.    Date of injury: no  Type of injury: no.    Date of surgery: No  Type of surgery: No.    Smoker: No  Request smoking cessation information: No    Ht 1.778 m (5' 10\")   Wt 85.3 kg (188 lb)   BMI 26.98 kg/m      Pain Assessment  Patient Currently in Pain: Yes  0-10 Pain Scale: 4    Oswestry (MESERET) Questionnaire    OSWESTRY DISABILITY INDEX 1/23/2022   Count 10   Sum 16   Oswestry Score (%) 32   Some recent data might be hidden        Visual Analog Pain Scale  Back Pain Scale 0-10: 4  Right leg pain: 0  Left leg pain: 0  Neck Pain Scale 0-10: 0  Right arm pain: 0  Left arm pain: 0    Promis 10 Assessment    PROMIS 10 1/23/2022   In general, would you say your health is: Good   In general, would you say your quality of life is: Fair   In general, how would you rate your physical health? Good   In general, how would you rate your mental health, including your mood and your ability to think? Good   In general, how would you rate your satisfaction with your social activities and relationships? Very good   In general, please rate how well you carry out your usual social activities and roles Very good   To what extent are you able to carry out your everyday physical activities such as walking, climbing stairs, carrying groceries, or moving a chair? Moderately   How often have you been bothered by emotional problems such as feeling anxious, depressed or irritable? Rarely   How would you rate your fatigue on average? Mild   How would you rate your pain on average?   0 = No Pain  to  10 = Worst Imaginable Pain 3   In general, would you say your health is: 3   In general, would you say " your quality of life is: 2   In general, how would you rate your physical health? 3   In general, how would you rate your mental health, including your mood and your ability to think? 3   In general, how would you rate your satisfaction with your social activities and relationships? 4   In general, please rate how well you carry out your usual social activities and roles. (This includes activities at home, at work and in your community, and responsibilities as a parent, child, spouse, employee, friend, etc.) 4   To what extent are you able to carry out your everyday physical activities such as walking, climbing stairs, carrying groceries, or moving a chair? 3   In the past 7 days, how often have you been bothered by emotional problems such as feeling anxious, depressed, or irritable? 2   In the past 7 days, how would you rate your fatigue on average? 2   In the past 7 days, how would you rate your pain on average, where 0 means no pain, and 10 means worst imaginable pain? 3   Global Mental Health Score 13   Global Physical Health Score 14   PROMIS TOTAL - SUBSCORES 27   Some recent data might be hidden                Darlene Welch LPN

## 2022-01-24 NOTE — LETTER
1/24/2022         RE: Bernabe Garcia  [facility] [id number]        Dear Colleague,    Thank you for referring your patient, Bernabe Garcia, to the [facility]. Please see a copy of my visit note below.        Spine Surgery Consultation    REFERRING PHYSICIAN: Aislinn Mccollum   PRIMARY CARE PHYSICIAN: Aislinn Mccollum           Chief Complaint:   Consult (Chronic midline low back pain without sciatica, Lumbar disc herniation/MRI/[facility]ccoll[facility] )      History of Present Illness:  Symptom Profile Including: location of symptoms, onset, severity, exacerbating/alleviating factors, previous treatments:        Bernabe Garcia is a 46 year old male who presents today for evaluation of low back pain without sciatica.  Has had the symptoms for 10 to 15 years off and on.  More recently become more bothersome.  He previously was very fit did [employer] X, ran marathons etc.  More recently has had to avoid physical activity because of the severe pain that it causes him.  He had enrolled in physical therapy and yoga with some relief however this was short-lived as his insurance apparently only gave him a few appointments.  Brief summary of his presenting concerns falls.    Primary complaint back pain with radiation into buttocks bilaterally, R > L  Most recent episode started in spring  Had pred which didn't help as much as usual  Ibuprofen as needed  Physical therapy was short-lived due to insurance; tried HEP             Past Medical History:     Patient Active Problem List   Diagnosis     Dysthymic disorder     Erectile dysfunction     Family history of ischemic heart disease     Chronic midline low back pain without sciatica     Alcohol dependence in remission (H)     CARDIOVASCULAR SCREENING; LDL GOAL LESS THAN 160     History of proctitis     Other tobacco product nicotine dependence, uncomplicated     DAKOTA (obstructive sleep apnea)-  severe (AHI 36)     Chronic  "insomnia     Lumbar pars defect     Lumbar disc herniation              Past Surgical History:     Past Surgical History:   Procedure Laterality Date     COLONOSCOPY N/A 2/3/2016    Procedure: COMBINED COLONOSCOPY, SINGLE OR MULTIPLE BIOPSY/POLYPECTOMY BY BIOPSY;  Surgeon: Charlie Rosa MD;  Location: [facility]     ENT SURGERY      Uvulectomy     TONSILLECTOMY  1975     VASECTOMY  2005            Social History:     Social History     Tobacco Use     Smoking status: Never Smoker     Smokeless tobacco: Former User     Tobacco comment: chew   Substance Use Topics     Alcohol use: No     Comment: quit             Family History:     Family History   Problem Relation Age of Onset     Family History Negative Mother      Family History Negative Father      C.A.D. Maternal Grandfather          in his mid 50's, of an MI     C.A.D. Paternal Grandfather          in his early 50's of an MI            Allergies:     Allergies   Allergen Reactions     No Known Drug Allergies             Medications:     Current Outpatient Medications   Medication     citalopram (CELEXA) 40 MG tablet     nicotine (NICODERM CQ) 14 MG/24HR patch 2h hr     nicotine (NICORETTE) 2 MG gum     order for DME     No current facility-administered medications for this visit.             Review of Systems:     A 10 point ROS was performed and reviewed. Specific responses to these questions are noted at the end of the document.         Physical Exam:     PHYSICAL EXAM:   Constitutional - Patient is healthy, well-nourished and appears stated age, is in no acute distress    Vitals: Ht 1.778 m (5' 10\")   Wt 85.3 kg (188 lb)   BMI 26.98 kg/m     Respiratory - Patient is breathing normally, no audible wheeze or respiratory distress.   Skin - No suspicious rashes or lesions.   Psychiatric - Normal mood and affect.   Cardiovascular - Extremities warm and well perfused.   GI - No abdominal distention.   Musculoskeletal - Non-antalgic gait " without use of assistive devices.  Is tender to palpation over the PSIS bilaterally, left greater than right.  Able to walk on his tiptoes and heels.  Single-leg stance with hyperextension lumbar spine creates increased pain bilaterally.  5/5 strength with hip flexion, knee extension and flexion, ankle plantar and dorsiflexion, great toe extension.  Sensation is intact to light touch in the L3-S1 distribution bilaterally.  2+ reflexes at patella tendon and Achilles tendon.  2 beats of clonus bilaterally.         Imaging:   We ordered and independently reviewed new radiographs at this clinic visit. The results were discussed with the patient.  Findings include:    AP, lateral and flex ex views of the lumbar spine demonstrate disc base collapse at L5-S1 with grade 1 anterolisthesis, lucency through posterior elements of L5 consistent with pars defect.  No dynamic instability    MRI obtained 1/18 redemonstrates bilateral pars defect, disc base collapse L5-S1, L5-S1 disc herniation with extruded fragment cranial causing compression of the traversing L5 nerve root.  In addition or severe neuroforaminal stenosis bilaterally at L5-S1.  In addition there are Modic changes at the L5-S1 endplates.        MIdline      Right neuroforamen showing severe L5-S1 stenosis      Left neuroforamen             Assessment and Plan:   Assessment:  46 year old male with bilateral L5 isthmic spondylolisthesis, central and neuroforaminal stenosis at L5-S1 level.     Has had physical therapy and remains in pretty good shape at this point.  Reviewed other options for nonoperative management of his condition which at this point will include regular anti-inflammatory medications, Tylenol, gabapentin or Lyrica which might help relieve some of his pain, transfemoral epidural steroid injection which would be targeted to reduce pain rating into his buttocks, pars injection which would help to estimate relief we might expect for her to stabilize that  level.    Very good conversation regarding risk turns and benefits of these approaches.  If nonoperative measures failed provide adequate relief for him recommended surgery given his pars defect with bilateral foraminal stenosis would be an L5 Chris laminectomy which will offer effective decompression followed by stabilization of that level with interbody cage and pedicle screws and rods.  He is relatively young and healthy and has had symptoms for some 15 years which makes me less optimistic that he is going to have long-lasting relief with continued nonoperative measures.  I discussed as much with the patient.  At this point he would like to consider his options and will contact me to let me know when and how he would like to proceed.  I have asked to make a 2-week follow-up virtual visit to discuss this.  If he makes a decision prior to that invite him to contact by telephone or MyChart.  All questions were answered.      Time spent on this clinical encounter including previsit chart review, history and physical examination, patient counseling and documentation was 65 minutes on the date of encounter.        Respectfully,  Serafin Torres MD  Spine Surgery  [facility]

## 2022-01-24 NOTE — PROGRESS NOTES
Spine Surgery Consultation    REFERRING PHYSICIAN: Aislinn Mccollum   PRIMARY CARE PHYSICIAN: Aislinn Mccollum           Chief Complaint:   Consult (Chronic midline low back pain without sciatica, Lumbar disc herniation/MRI/[facility]ccoll[facility] )      History of Present Illness:  Symptom Profile Including: location of symptoms, onset, severity, exacerbating/alleviating factors, previous treatments:        Bernabe Garcia is a 46 year old male who presents today for evaluation of low back pain without sciatica.  Has had the symptoms for 10 to 15 years off and on.  More recently become more bothersome.  He previously was very fit did [employer] X, ran marathons etc.  More recently has had to avoid physical activity because of the severe pain that it causes him.  He had enrolled in physical therapy and yoga with some relief however this was short-lived as his insurance apparently only gave him a few appointments.  Brief summary of his presenting concerns falls.    Primary complaint back pain with radiation into buttocks bilaterally, R > L  Most recent episode started in spring  Had pred which didn't help as much as usual  Ibuprofen as needed  Physical therapy was short-lived due to insurance; tried HEP             Past Medical History:     Patient Active Problem List   Diagnosis     Dysthymic disorder     Erectile dysfunction     Family history of ischemic heart disease     Chronic midline low back pain without sciatica     Alcohol dependence in remission (H)     CARDIOVASCULAR SCREENING; LDL GOAL LESS THAN 160     History of proctitis     Other tobacco product nicotine dependence, uncomplicated     DAKOTA (obstructive sleep apnea)-  severe (AHI 36)     Chronic insomnia     Lumbar pars defect     Lumbar disc herniation              Past Surgical History:     Past Surgical History:   Procedure Laterality Date     COLONOSCOPY N/A 2/3/2016    Procedure: COMBINED COLONOSCOPY, SINGLE OR MULTIPLE BIOPSY/POLYPECTOMY BY  "BIOPSY;  Surgeon: Charlie Rosa MD;  Location: [facility]     ENT SURGERY      Uvulectomy     TONSILLECTOMY  1975     VASECTOMY              Social History:     Social History     Tobacco Use     Smoking status: Never Smoker     Smokeless tobacco: Former User     Tobacco comment: chew   Substance Use Topics     Alcohol use: No     Comment: quit             Family History:     Family History   Problem Relation Age of Onset     Family History Negative Mother      Family History Negative Father      C.A.D. Maternal Grandfather          in his mid 50's, of an MI     C.A.D. Paternal Grandfather          in his early 50's of an MI            Allergies:     Allergies   Allergen Reactions     No Known Drug Allergies             Medications:     Current Outpatient Medications   Medication     citalopram (CELEXA) 40 MG tablet     nicotine (NICODERM CQ) 14 MG/24HR patch 2h hr     nicotine (NICORETTE) 2 MG gum     order for DME     No current facility-administered medications for this visit.             Review of Systems:     A 10 point ROS was performed and reviewed. Specific responses to these questions are noted at the end of the document.         Physical Exam:     PHYSICAL EXAM:   Constitutional - Patient is healthy, well-nourished and appears stated age, is in no acute distress    Vitals: Ht 1.778 m (5' 10\")   Wt 85.3 kg (188 lb)   BMI 26.98 kg/m     Respiratory - Patient is breathing normally, no audible wheeze or respiratory distress.   Skin - No suspicious rashes or lesions.   Psychiatric - Normal mood and affect.   Cardiovascular - Extremities warm and well perfused.   GI - No abdominal distention.   Musculoskeletal - Non-antalgic gait without use of assistive devices.  Is tender to palpation over the PSIS bilaterally, left greater than right.  Able to walk on his tiptoes and heels.  Single-leg stance with hyperextension lumbar spine creates increased pain bilaterally.  5/5 strength with hip " flexion, knee extension and flexion, ankle plantar and dorsiflexion, great toe extension.  Sensation is intact to light touch in the L3-S1 distribution bilaterally.  2+ reflexes at patella tendon and Achilles tendon.  2 beats of clonus bilaterally.         Imaging:   We ordered and independently reviewed new radiographs at this clinic visit. The results were discussed with the patient.  Findings include:    AP, lateral and flex ex views of the lumbar spine demonstrate disc base collapse at L5-S1 with grade 1 anterolisthesis, lucency through posterior elements of L5 consistent with pars defect.  No dynamic instability    MRI obtained 1/18 redemonstrates bilateral pars defect, disc base collapse L5-S1, L5-S1 disc herniation with extruded fragment cranial causing compression of the traversing L5 nerve root.  In addition or severe neuroforaminal stenosis bilaterally at L5-S1.  In addition there are Modic changes at the L5-S1 endplates.        MIdline      Right neuroforamen showing severe L5-S1 stenosis      Left neuroforamen             Assessment and Plan:   Assessment:  46 year old male with bilateral L5 isthmic spondylolisthesis, central and neuroforaminal stenosis at L5-S1 level.     Has had physical therapy and remains in pretty good shape at this point.  Reviewed other options for nonoperative management of his condition which at this point will include regular anti-inflammatory medications, Tylenol, gabapentin or Lyrica which might help relieve some of his pain, transfemoral epidural steroid injection which would be targeted to reduce pain rating into his buttocks, pars injection which would help to estimate relief we might expect for her to stabilize that level.    Very good conversation regarding risk turns and benefits of these approaches.  If nonoperative measures failed provide adequate relief for him recommended surgery given his pars defect with bilateral foraminal stenosis would be an L5 Chris laminectomy  which will offer effective decompression followed by stabilization of that level with interbody cage and pedicle screws and rods.  He is relatively young and healthy and has had symptoms for some 15 years which makes me less optimistic that he is going to have long-lasting relief with continued nonoperative measures.  I discussed as much with the patient.  At this point he would like to consider his options and will contact me to let me know when and how he would like to proceed.  I have asked to make a 2-week follow-up virtual visit to discuss this.  If he makes a decision prior to that invite him to contact by telephone or MyChart.  All questions were answered.      Time spent on this clinical encounter including previsit chart review, history and physical examination, patient counseling and documentation was 65 minutes on the date of encounter.        Respectfully,  Serafin Torres MD  Spine Surgery  [facility]

## 2022-01-28 ENCOUNTER — MYC MEDICAL ADVICE (OUTPATIENT)
Dept: ORTHOPEDICS | Facility: CLINIC | Age: 47
End: 2022-01-28
Payer: COMMERCIAL

## 2022-01-31 DIAGNOSIS — M54.50 LOW BACK PAIN: Primary | ICD-10-CM

## 2022-01-31 NOTE — TELEPHONE ENCOUNTER
Writer talked with patient on the phone and informed faxed injection order to [facility] and also gave phone number to [facility] [phone].    Darlene Welch LPN

## 2022-02-08 ENCOUNTER — TRANSFERRED RECORDS (OUTPATIENT)
Dept: HEALTH INFORMATION MANAGEMENT | Facility: CLINIC | Age: 47
End: 2022-02-08
Payer: COMMERCIAL

## 2022-02-08 ENCOUNTER — TELEPHONE (OUTPATIENT)
Dept: ORTHOPEDICS | Facility: CLINIC | Age: 47
End: 2022-02-08
Payer: COMMERCIAL

## 2022-02-08 NOTE — TELEPHONE ENCOUNTER
[facility] Call Center    Phone Message    May a detailed message be left on voicemail: yes     Reason for Call: Other: Patient has orders for injection, but orders do not state if it's supposed to be for a unilateral, bilateral or sacral injection.    Please call [facility] back as soon as possible to clarify as patient has appt today @ 2:00 PM.    Action Taken: Message routed to:  [facility] ([facility] ortho    Travel Screening: Not Applicable

## 2022-02-08 NOTE — TELEPHONE ENCOUNTER
Confirmed order with Dr. Torres's team. They stated that it's bilateral. Returned call to [facility] and relayed info to them. They will call with questions or concerns.           -RICHARD Rausch- Orthopedics

## 2022-03-08 DIAGNOSIS — F34.1 DYSTHYMIC DISORDER: ICD-10-CM

## 2022-03-10 RX ORDER — CITALOPRAM HYDROBROMIDE 40 MG/1
TABLET ORAL
Qty: 90 TABLET | Refills: 3 | OUTPATIENT
Start: 2022-03-10

## 2022-05-03 DIAGNOSIS — F34.1 DYSTHYMIC DISORDER: ICD-10-CM

## 2022-05-06 RX ORDER — CITALOPRAM HYDROBROMIDE 40 MG/1
TABLET ORAL
Qty: 90 TABLET | Refills: 2 | Status: SHIPPED | OUTPATIENT
Start: 2022-05-06 | End: 2023-01-23

## 2022-05-06 NOTE — TELEPHONE ENCOUNTER
Prescription approved per [facility] Refill Protocol.    Cierra Roberson RN   [facility]

## 2023-01-20 DIAGNOSIS — F34.1 DYSTHYMIC DISORDER: ICD-10-CM

## 2023-01-23 RX ORDER — CITALOPRAM HYDROBROMIDE 40 MG/1
TABLET ORAL
Qty: 90 TABLET | Refills: 0 | Status: SHIPPED | OUTPATIENT
Start: 2023-01-23 | End: 2023-04-27

## 2023-04-16 ENCOUNTER — HEALTH MAINTENANCE LETTER (OUTPATIENT)
Age: 48
End: 2023-04-16

## 2023-04-26 DIAGNOSIS — F34.1 DYSTHYMIC DISORDER: ICD-10-CM

## 2023-04-27 RX ORDER — CITALOPRAM HYDROBROMIDE 40 MG/1
TABLET ORAL
Qty: 90 TABLET | Refills: 0 | Status: SHIPPED | OUTPATIENT
Start: 2023-04-27 | End: 2023-07-24

## 2023-07-23 DIAGNOSIS — F34.1 DYSTHYMIC DISORDER: ICD-10-CM

## 2023-07-24 RX ORDER — CITALOPRAM HYDROBROMIDE 40 MG/1
TABLET ORAL
Qty: 90 TABLET | Refills: 0 | Status: SHIPPED | OUTPATIENT
Start: 2023-07-24 | End: 2023-10-20

## 2023-10-19 DIAGNOSIS — F34.1 DYSTHYMIC DISORDER: ICD-10-CM

## 2023-10-20 RX ORDER — CITALOPRAM HYDROBROMIDE 40 MG/1
TABLET ORAL
Qty: 90 TABLET | Refills: 0 | Status: SHIPPED | OUTPATIENT
Start: 2023-10-20

## 2024-06-23 ENCOUNTER — HEALTH MAINTENANCE LETTER (OUTPATIENT)
Age: 49
End: 2024-06-23